# Patient Record
(demographics unavailable — no encounter records)

---

## 2017-01-01 NOTE — PN
Date/Time of Note


Date/Time of Note


DATE: 6/10/17 


TIME: 11:13





Neonatology History


Date/Time


Admit Date/Time


May 25, 2017 at 20:50





Day of Life


Day of Life


17





History of Present Illness


HPI


 31-5/7 week first of twins 1469 g infant of gestational diabetic mother 

was  labor from 24-6/7 week, hypermagnesemia.  Presently 34-0/7     

week.  Baby required positive pressure ventilation and was subsequently placed 

on bubble CPAP transfer to the NICU.  Taken off CPAP on  AM.  Started on 

caffeine on admission, caffeine dc'd 


Rupture of membranes at  section, no fever, started on antibiotics 

empirically.


Feeding started tolerated with some residuals, on peripheral TPN, dc'd , 

Feeding per gavage 24 alf


Hyperbilirubinemia started on phototherapy on -, Head US  normal


At risk for problems related to prematurity such as apnea hyperbilirubinemia 

feeding intolerance and necrotizing enterocolitis intracranial hemorrhage 

retinopathy of prematurity and long-term neurodevelopmental problems.





Physical Exam


Vital Signs


Vitals





 Vital Signs








  Date Time  Temp Pulse Resp B/P Pulse Ox O2 Delivery O2 Flow Rate FiO2


 


6/10/17 11:00 98.8 150 53  100   


 


6/10/17 08:00 98.6 156 54 70/43 100   


 


6/10/17 07:35  174 56  99   21


 


6/10/17 05:00 98.1 139 58  100   





NPASS Score-Pain: 0





I&O/Weight


I&O


Daily Weight: 1600 grams, Daily Weight change from yesterday: 0 grams, Percent 

change from birth: 8.917, Weight based intake: 160.0000 mL/kg/day, urine output 

9, BM 2.








I & O   


 


 6/10/17 6/10/17 6/10/17





 01:00 09:00 17:00


 


Intake Total 64.0 ml 81.0 ml 32.0 ml


 


Output Total 0 ml 0 ml 


 


Balance 64.0 ml 81.0 ml 32.0 ml


 


 Intake Detail   


 


Bottle 12 ml 17 ml 


 


Tube Feeding 52.0 ml 64.0 ml 32.0 ml


 


 Output Detail   


 


Tube Feeding Residual Discard 0 ml 0 ml 


 


# Urine Diapers 2 3 1


 


# Bowel Movements 1  


 


Daily Weight Change 0 gms  


 


Percent Weight Change from Birth 8.917 %  


 


Tube Feeding Gavage Duration 30 minutes 30 minutes 30 minutes





 30 minutes 30 minutes 





  30 minutes 











Physical Exam


Infant in Isolette, responsive, pink, comfortable in room air


HEENT: Anterior fontanelle soft and flat, eyes no congestion or discharge, ENT 

within normal limits with NG tube in place


Cardiovascular: Rate and rhythm regular, no murmurs, peripheral perfusion is 

adequate


Pulmonary: Equal breath sounds, good air exchange, clear with no retractions 

and normal work of breathing


Abdomen: Soft, nondistended, round, normal bowel sounds, no masses palpable, 

nontender


Genitalia: Normal  male


Neurology: Normal tone and activity for gestational age


Extremities: Adequate range of motion with good perfusion


Skin: Mild perianal erythema and no rashes


Head Circumference:  29.5





Medications





 Current Medications


Multivitamins/ Vitamin C (Poly-Vi-Sol (Nicu)) 0.5 ml BID PO  Last administered 

on 6/10/17at 08:11; Admin Dose 0.5 ML;  Start 6/3/17 at 09:45


Ferrous Sulfate (Juan-In-Sol 5 Mg/ 0.33 ml (Nicu)) 1.5 mg Q12 PO  Last 

administered on 6/10/17at 08:11; Admin Dose 1.5 MG;  Start 17 at 21:00





Medical Decision Making


Assessment


1.  Fluids and nutrition: Weight today is 1600 g unchanged from yesterday.  

Infant is on full feedings with Similac special care 24 Alf and is receiving 32 

mL every 3 hours over 30 minutes.  Infant nippled 4 times during the last 24 

hours and was able to nipple ranging from 5-32 mL.  Infant required mostly NG 

feedings and is tolerating well with no significant residuals.  Total fluid 

intake 1 60 mL/kg per day, urine output 9, BM 2.  There are no clinical signs 

of gastroesophageal reflux or NEC.


2.  Respiratory:  History of retained lung fluid and bubble CPAP, to room air 

on .  Never had apnea, started on caffeine which was stopped on .  No 

apnea.


3.  Metabolic.  Initial magnesium 5.4, and low calcium asymptomatic improved.  

Stable Accu-Cheks.


4.  Heme:  Hematocrit was 51 platelets 247 on .


5.  Infection:  Congenital sepsis ruled out, antibiotics stopped after 2 days.


6.  GI/bili:  History of phototherapy, maximum bilirubin initial 9.4, rebound 

tenderness 9.8.  Last bilirubin down to 6.4 on 6/3 and jaundice is clinically 

resolved.  Blood type O+ Dena negative.


7.  CNS.  Normal tone and activity, stable temperature in incubator, normal 

neuro exam.  Head ultrasound on  normal.


8.  Cardiovascular: Hemodynamically stable


9.  Social.  Parents visiting and aware of the infant's clinical condition as 

well as the treatment plans





Today's Plan


Plan


Frequent monitoring of vital signs as well as pulse ox saturations and maintain 

greater than 90% and maintain neutral thermal environment.


Continue to monitor for desaturations as well as apnea prematurity.


Continue the present feedings and p.o. as tolerated and NG as needed monitoring 

for gastroesophageal reflux.


Monitor weight gain.


Monitor hematocrit once in 2 weeks during hospitalization and monitor for 

anemia.


Repeat head ultrasound at 36 weeks of gestation to rule out PVL.


ROP examination at 4-6 weeks of age.


Ongoing parental support and teaching.











PEYTON STEVEN MD Humza 10, 2017 11:21

## 2017-01-01 NOTE — PN
[facility]


 


 


 


 


 Progress Note 


 


Patient Name: Jhonathan Loza Unit Number: [id number]


YOB: 2017 Patient Status: Admitted Inpatient


Attending Doctor: Bee Bhardwaj MD Account Number: [id number]


 


Edit: BEE BHARDWAJ MD on 17 @ 22:36





I have seen and examined this infant with RAI TAYLOR.  Concur with physical 

examination and assessment. HEENT normal, chest clear good breath sounds, heart 

regular rhythm no murmurs, abdomen soft good bowel sounds no organomegaly, 

genitalia normal, extremities full range of motion good perfusion, CNS tone 

appropriate, skin pink no rashes.  Concur with plan to work on nutritive support

, monitor for respiratory distress or apnea prematurity, follow hematocrit 

weekly, complete discharge training and teaching.


________________________________________________________________________________

_____


  


Date/Time of Note


Date/Time of Note


DATE: 17 


TIME: 09:15





Neonatology History


Date/Time


Admit Date/Time


May 25, 2017 at 20:50





Day of Life


Day of Life


15





History of Present Illness


HPI


 31-5/7 week first of twins 1469 g infant of gestational diabetic mother 

was  labor from 24-6/7 week, hypermagnesemia.  Presently 33-5/7     

week.  Baby required positive pressure ventilation and was subsequently placed 

on bubble CPAP transfer to the NICU.  Taken off CPAP on  AM.  Started on 

caffeine on admission.caffeine dc'd 


Rupture of membranes at  section, no fever, started on antibiotics 

empirically.


Feeding started tolerated with some residuals, on peripheral TPN, dc'd , 

Feeding per gavage 24 alf


Hyperbilirubinemia started on phototherapy on -, Head US  normal


At risk for problems related to prematurity such as apnea hyperbilirubinemia 

feeding intolerance and necrotizing enterocolitis intracranial hemorrhage 

retinopathy of prematurity and long-term neurodevelopmental problems.





Physical Exam


Vital Signs


Vitals





 Vital Signs








  Date Time  Temp Pulse Resp B/P Pulse Ox O2 Delivery O2 Flow Rate FiO2


 


17 08:00 98.6 172 32 68/48 100   


 


17 07:20  148 42  99   21


 


17 05:00 98.6 155 35  99   


 


17 03:08  158 35  100   21


 


17 02:00 98.6 165 57  100   





NPASS Score-Pain: 0





I&O/Weight


I&O


Daily Weight: 1560 grams, Daily Weight change from yesterday: 55.0 grams, 

Percent change from birth: 6.194, Weight based intake: 153.8461 mL/kg/day, 

Weight based output: 3.543 mL/kg/hr











I & O   


 


 17





 00:59 08:59 16:59


 


Intake Total 90.0 ml 91.0 ml 


 


Output Total 0 ml 0 ml 


 


Balance 90.0 ml 91.0 ml 


 


 Intake Detail   


 


Bottle  8 ml 


 


Tube Feeding 90.0 ml 83.0 ml 


 


 Output Detail   


 


Tube Feeding Residual Discard 0 ml 0 ml 


 


# Urine Diapers 3 3 


 


# Bowel Movements 1 0 


 


Daily Weight Change 55.0!^di  


 


Percent Weight Change from Birth 6.194 %  


 


Tube Feeding Gavage Duration 45 minutes 30 minutes 





 30 minutes 30 minutes 





 30 minutes 30 minutes 











Physical Exam


Active and alert.  In giraffe Isolette


HEENT: [place] soft and flat. Eyes clear without drainage. Ears nose and 

throat without abnormality.


Pulmonary: Respirations are comfortable, breath sounds are bilaterally clear 

and equal.


Cardiovascular: Heart rate and rhythm are normal, no murmur is auscultated. 

Perfusion is good with  quick capillary refill.


Abdomen: Soft without distention. No masses palpated.


: Normal male genitalia.


Neuro: Tone and behavior appropriate for gestational age.


Dermatology: Skin clear and free of rashes.


Extremities: Full range of motion, tone and behavior appropriate for 

gestational age.


Head Circumference:  29.5





Medications





 Current Medications


Multivitamins/ Vitamin C (Poly-Vi-Sol (Nicu)) 0.5 ml BID PO  Last administered 

on t 08:16; Admin Dose 0.5 ML;  Start 6/3/17 at 09:45


Ferrous Sulfate (Juan-In-Sol 5 Mg/ 0.33 ml (Nicu)) 1.5 mg Q12 PO  Last 

administered on t 08:16; Admin Dose 1.5 MG;  Start 17 at 21:00





Medical Decision Making


Assessment


1.  Fluids and nutrition.  The weight is 1560 up 55 g.  Intake 153 mL/kg urine 

4.3 mL/kg/h stool 4.  Tolerating feeding breast milk 24 alf or special care 24 

at 31 mL every 3 hours by gavage,nippled once taking 8 mls.  IV was 

discontinued on 


2.  Respiratory.  Retained lung fluid, bubble CPAP treatment, went to room air 

on .  Started on caffeine, never had apnea.caffeine dc'd 


3.  Metabolic.  Initial magnesium 5.4, low calcium asymptomatic, improved.  

Stable Accu-Cheks.


4.  Heme.  Hematocrit 51 platelets 247 on 


5.  Infection.  Congenital sepsis ruled out, antibiotics stopped after 2 days.


6.  GI/bili.  History of phototherapy, maximum bilirubin initially 9.4 and up 

to 9.8 and a rebound after phototherapy.  Bilirubin is down to 6.4 on 6/3, and 

baby is not jaundiced.  Blood type O+ Dena negative.


7.  CNS.  Normal neuro exam, normal tone and activity, temperature stable in 

incubator.  Head ultrasound on  normal.


8.  Cardiovascular.  Hemodynamically stable.


9.  Social.  Parents visiting, and updated.





Today's Plan


Plan


Continue neutral thermal environment, monitor feeding tolerance and weight gain.


monitor for apnea off caffeine


continue Juan-In-Sol, monitor hemogram


Monitor for problems related to prematurity


Support parents with information and teaching..


CUS at 36 wks and ROP exam at 4 to 6 weeks











HERBERT NELSON NP 2017 09:17

## 2017-01-01 NOTE — DS
Date/Time of Note


Date/Time of Note


DATE: 17 


TIME: 09:19





Discharge Summary


Admission/Discharge Info


Admit Date/Time


May 25, 2017 at 20:50


Discharge Date/Time


2017


Final Diagnosis


31 5/7 wk premature twin now 35 wks corrected gestational age.  Status post 

respiratory distress secondary to retained lung fluid requiring bubble CPAP 

support for 24 hours, s/p apnea of prematurity, status post hyperbilirubinemia, 

at risk for ROP secondary to low birthweight and prematurity.


Patient Condition:  Stable


Procedures


IV fluid administration, CPAP support, phototherapy, cranial ultrasound, 

hearing screen, car seat challenge,CCHD screen.


Hx of Present Illness


 31-5/7 week first of twins 1469 g infant of gestational diabetic mother 

with  labor from 24-6/7 week, hypermagnesemia. Baby required positive 

pressure ventilation and was subsequently placed on bubble CPAP transfer to the 

NICU.  Taken off CPAP on  AM.  Started on caffeine on admission, caffeine 

dc'd 


Rupture of membranes at  section, no fever, started on antibiotics 

empirically.


Hospital Course


Infant was born by primary  for footling breech twin to mom in  

labor.  Apgars were 5 and 8.  Admitted to NICU due to prematurity and 

respiratory distress


Respiratory: Infant received positive pressure ventilation and CPAP support in 

the delivery room and was supported with bubble CPAP for 24 hours with mild 

FiO2 requirements.  He was started on caffeine prophylactically on admission 

and has had no active events.  Caffeine was discontinued on .





Cardiovascular: Infant has been hemodynamically stable with no murmurs 

auscultated.  Mean blood pressure ranges are in the 40s.  Perfusion is good 

with quick capillary refill and pulses are equal and palpable 4.  See CHD 

screen was performed and passed on Yvette 3.





Infectious disease: Due to history of  labor and initial respiratory 

distress: Infant was placed on ampicillin and gentamicin and gentamicin 

empirically.  Initial CBCs were reassuring and blood cultures negative and 

antibiotics discontinued after 48 hours.  Hepatitis B vaccination was 

administered .





Growth and nutrition: The infant was started on IV fluids on admission with 

total parental nutrition and slow enteral feedings were introduced and 

tolerated.  IV fluids were discontinued on .  Infant has been tolerating 

on nipple feedings the last 2 days prior to discharge taking NeoSure or breast 

milk fortified to 22 alf with NeoSure powder, taking 40-50 mL's with each 

feeding with consistent weight gain.





Hematology: Mom's blood type O+ baby is O+ as well with a negative Dena.  Had 

mild elevation of bilirubin and required phototherapy  through .  

Maximum bilirubin was 9.4.  Last bilirubin was checked on Yvette 3 with a value 

of 6.4.  Last hematocrit was 38 on .  Infant has been on supplemental 

iron p.o.





Neuro: Cranial ultrasound was performed on  which was normal.  Hearing 

screen was performed on  in which he passed in a car seat challenge on 

 in which he passed.  Is recommended that he have an initial ROP exam at 

4-6 weeks of age as an outpatient with   due to low birthweight and 

gestational age.





Social: Parents have been visiting daily and demonstrated ability to care for  

baby


Follow-up Plan


Plan is to discharge home to the care of the family with feedings of breastmilk 

fortified to 22-calorie using NeoSure powder, with ad koko. volumes.  Infant is 

been taking 40-50 mL's with each feeding here.  Administer multivitamins 1 mL 

p.o. daily and iron supplements of 3.6 mg a day.  Follow-up with Dr. whit Iyer on 

Monday, .  Was recommended infant have an ROP exam with Dr. Gann as  

an outpatient  at 4-6 weeks of life which would be in 1-2 weeks.  I have 

requested social service department to schedule the appointment for the family 

and call the family and inform them.  I have also provided the family with Dr. Gann's phone number.


Primary Care Provider


Care Physician No Primary


Time spent on discharge:   > 30 minutes











HERBERT NELSON NP 2017 09:30

## 2017-01-01 NOTE — PN
Date/Time of Note


Date/Time of Note


DATE: 17 


TIME: 11:36





Neonatology History


Date/Time


Admit Date/Time


May 25, 2017 at 20:50





Day of Life


Day of Life


16





History of Present Illness


HPI


 31-5/7 week first of twins 1469 g infant of gestational diabetic mother 

was  labor from 24-6/7 week, hypermagnesemia.  Presently 33-6/7     

week.  Baby required positive pressure ventilation and was subsequently placed 

on bubble CPAP transfer to the NICU.  Taken off CPAP on  AM.  Started on 

caffeine on admission, caffeine dc'd 


Rupture of membranes at  section, no fever, started on antibiotics 

empirically.


Feeding started tolerated with some residuals, on peripheral TPN, dc'd , 

Feeding per gavage 24 alf


Hyperbilirubinemia started on phototherapy on -, Head US  normal


At risk for problems related to prematurity such as apnea hyperbilirubinemia 

feeding intolerance and necrotizing enterocolitis intracranial hemorrhage 

retinopathy of prematurity and long-term neurodevelopmental problems.





Physical Exam


Vital Signs


Vitals





 Vital Signs








  Date Time  Temp Pulse Resp B/P Pulse Ox O2 Delivery O2 Flow Rate FiO2


 


17 11:12  146 66  100   21


 


17 08:00 98.6 176 42 88/53 100   


 


17 07:38  158 51  100   21


 


17 05:00 98.8 156 53 78/41 100   





NPASS Score-Pain: 0





I&O/Weight


I&O


Daily Weight: 1600 grams, Daily Weight change from yesterday: 40.0 grams, 

Percent change from birth: 8.917, Weight based intake: 155.0000 mL/kg/day, 

Weight based output: 3.543 mL/kg/hr











I & O   


 


 17





 01:00 09:00 17:00


 


Intake Total 62.0 ml 94.0 ml 


 


Balance 62.0 ml 94.0 ml 


 


 Intake Detail   


 


Bottle  25 ml 


 


Tube Feeding 62.0 ml 69.0 ml 


 


 Output Detail   


 


# Urine Diapers 2 3 


 


# Bowel Movements  1 


 


Daily Weight Change 40.0!^di  


 


Percent Weight Change from Birth 8.917 %  


 


Tube Feeding Gavage Duration 30 minutes 30 minutes 





 30 minutes 30 minutes 





  5 minutes 











Physical Exam


Pink no distress in room air in incubator NG tube no distress.


Temperature 98.6 heart rate 146 respirations 66 blood pressure 88/53 mean 64


[place] sutures normal eyes ears nose throat without abnormality


Chest no retractions clear breath sounds heart sounds normal no murmur


Abdomen soft no mass organomegaly or hernia no distention


Extremities normal pulses


Genitalia normal  male testes descended


Skin no lesions or rashes no jaundice


CNS normal tone and activity


Head Circumference:  29.5





Medications





 Current Medications


Multivitamins/ Vitamin C (Poly-Vi-Sol (Nicu)) 0.5 ml BID PO  Last administered 

on  08:27; Admin Dose 0.5 ML;  Start 6/3/17 at 09:45


Ferrous Sulfate (Juan-In-Sol 5 Mg/ 0.33 ml (Nicu)) 1.5 mg Q12 PO  Last 

administered on  08:; Admin Dose 1.5 MG;  Start 17 at 21:00





Medical Decision Making


Assessment


Day of life 16.  Postmenstrual rate 33-6/7 week.  Weight is 1600 up 40 g.


Medication Juan-In-Sol Poly-Vi-Sol


1.  Fluids and nutrition.  The weight is 1600 up 40 g.  Intake 155 mL/kg urine 

8 stool 3.  Tolerating feeding rest milk 24 alf or special care 24 at 31 mL 

every 3 hours took a few p.o. partial feeding.


2.  Respiratory.  History of retained lung fluid and bubble CPAP, to room air 

on .  Never had apnea, started on caffeine which was stopped on .  No 

apnea.


3.  Metabolic.  Initial magnesium 5.4, and low calcium asymptomatic improved.  

Stable Accu-Cheks.


4.  Heme.  Hematocrit was 51 platelets 247 on .


5.  Infection.  Congenital sepsis ruled out, antibiotics stopped after 2 days.


6.  GI/bili.  History of phototherapy, maximum bilirubin initial 9.4, rebound 

tenderness 9.8.  Last bilirubin down to 6.4 on 6/3 and jaundice is clinically 

resolved.  Blood type O+ Dena negative.


7.  CNS.  Normal tone and activity, stable temperature in incubator, normal 

neuro exam.  Head ultrasound on  normal.


8.  Cardiovascular: Hemodynamically stable


9.  Social.  Parents visiting and have been updated.





Today's Plan


Plan


Continue neutral thermal environment


Continue nutritive support with 24 alf and gavage


Monitor hemogram


Monitor for apnea of caffeine discontinued on 6/5.


Monitor for problems related to prematurity


Repeat head ultrasound at 36 weeks


Retinopathy of prematurity exam at 4-6 weeks


Support parents with information and teaching











CONNER DIAZ 2017 11:42

## 2017-01-01 NOTE — PN
Date/Time of Note


Date/Time of Note


DATE: 17 


TIME: 11:38





Neonatology History


Date/Time


Admit Date/Time


May 25, 2017 at 20:50





Day of Life


Day of Life


4





History of Present Illness


HPI


 31-5/7 week first of twins 1469 g infant of gestational diabetic mother 

was  labor from 24-6/7 week, hypermagnesemia.  Presently 32-1/7     

week.  Baby required positive pressure ventilation and was subsequently placed 

on bubble CPAP transfer to the NICU.  Takenl off CPAP on  6 AM.  Started on 

caffeine on admission.


Rupture of membranes at  section, no fever, started on antibiotics 

empirically.


Feeding started tolerated with some residuals, on peripheral TPN.


Hyperbilirubinemia started on phototherapy on .


At risk for problems related to prematurity such as apnea hyperbilirubinemia 

feeding intolerance and necrotizing enterocolitis intracranial hemorrhage 

retinopathy of prematurity and long-term neurodevelopmental problems.





Physical Exam


Vital Signs


Vitals





 Vital Signs








  Date Time  Temp Pulse Resp B/P Pulse Ox O2 Delivery O2 Flow Rate FiO2


 


17 08:00 98.6 130 53 63/31 98   


 


17 07:35  162 60  99   21


 


17 05:00 98.2 140 50 77/40 100   





NPASS Score-Pain: 0





I&O/Weight


I&O


Daily Weight: 1290 grams, Daily Weight change from yesterday: -85.0 grams, 

Percent change from birth: -12.185, Weight based intake: 132.6530 mL/kg/day, 

Weight based output: 4.623 mL/kg/hr











I & O   


 


 17





 01:00 09:00 17:00


 


Intake Total 63.100 ml 62.075 ml 


 


Output Total 53.00 ml 74.00 ml 


 


Balance 10.100 ml -11.925 ml 


 


 Intake Detail   


 


IV Total 50.100 ml 40.075 ml 


 


Tube Feeding 13.0 ml 22.0 ml 


 


 Output Detail   


 


Urine Total 53.00 ml 74.00 ml 


 


Tube Feeding Residual Discard 0 ml 0 ml 


 


# Bowel Movements 1  


 


Daily Weight Change -85.0!^di  


 


Percent Weight Change from Birth -12.185 %  


 


Tube Feeding Gavage Duration 10 minutes 10 minutes 





 10 minutes 10 minutes 





  30 minutes 











Physical Exam


Pink no distress in incubator, room air, NG tube, peripheral IV, double 

phototherapy


Temperature 98.6 heart rate 130 respiration 53 pressure 63/31 mean 42


Happy sutures normal, EENT normal


Chest no retractions clear breath sounds heart sounds normal no murmur


Abdomen soft no distention cord stump dry no mass organomegaly or hernia


Extremities normal perfusion and pulses


Genitalia normal  male testes descended


Skin no lesions, jaundice not appreciated under phototherapy


Neuro normal exam


Head Circumference:  29.5





Medications





 Current Medications


Ampicillin (Ampicillin Iv Syg ([facility])) 75 mg Q12 IV*  Last administered on  09:42; Admin Dose 75 MG;  Start 17 at 22:00


Gentamicin Sulfate (Gentamicin Iv Syg ([facility])) 6.6 mg Q36H IV*  Last 

administered on  10:39; Admin Dose 6.6 MG;  Start 17 at 23:00


Caffeine Citrated 8.8 mg 8.8 mg Q24H IV  Last administered on  00:33; 

Admin Dose 8.8 MG;  Start 17 at 22:00


Total Parenteral Nutrition 250 ml @  5.7 mls/hr Q24H IV  Last administered on  12:42; Admin Dose 5.7 MLS/HR;  Start 17 at 13:00


Fat Emulsion Intravenous (Liposyn Ii 20% (Nicu)) 15 ml @  0.625 mls/ hr Q24H IV

  Last administered on  12:42; Admin Dose 0.625 MLS/HR;  Start 17 

at 13:00





Laboratory


Results 24 hrs





Laboratory Tests








Test


  17


13:59 17


04:30 17


04:34


 


Bedside Glucose 79    119  


 


Sodium Level  138   


 


Potassium Level  6.1  *H 


 


Chloride Level  114  H 


 


Carbon Dioxide Level  19  L 


 


Anion Gap  11   


 


Calcium Level  9.1   


 


Total Bilirubin  5.0  # 


 


Direct Bilirubin  0.00  L 


 


Indirect Bilirubin  5.0   











Medical Decision Making


Assessment


Day of life 4.  Postmenstrual rate 32-1/7 week.  Weight is 1290 down 85 g.


Medication ampicillin gentamicin caffeine IV.


Laboratory Accu-Chek 119 bilirubin 5.0 sodium 138 potassium 6.1 chloride 114 

CO2 19 calcium 9.1.


1.  Fluids and nutrition.  The weight is 1290 down 85 g.  Intake 132 mL/kg 

urine 4.6 mL/kg/h stool 1.  Tolerating feeding breast milk or special 20 alf 

at up to 8 mL every 3 hours, is on TPN dextrose 11% amino acid 4 lipids 2 g/kg 

at 130 mL/kg per day


2.  Respiratory.  History of transient support his bubble CPAP probably 

retained lung fluids, discontinue on .  Baby was started initially on 

caffeine.  No apnea.


3.  Metabolic.  Initial magnesium 5.4.  Infant of gestational diabetic mom, 

blood sugar stable last Accu-Chek 119.  Calcium slightly low and has improved.


4.  Heme.  Hematocrit 51 platelets 247 on .


5.  Infection.  Started on ampicillin gentamicin, CBC was normal, blood culture 

negative more than 48 hours.


6.  GI/bili.  On phototherapy was a maximum bilirubin of 9.4 down to 5.0.  

Blood type is O+ Dena negative No bruising or cephalic hematoma.


7.  CNS.  Normal tone and activity normal exam and maintaining temperature in 

incubator


8.  Cardiac.  Hemodynamically stable.  No murmur normal perfusion and pulses


9.  Social.  Parents visiting and updated..





Today's Plan


Plan


Stop antibiotics


Change to single phototherapy, monitor bilirubin


Advance feeding and continue TPN support, increase total fluid goal to 150 mL/kg

, dextrose 12% lipids 3 per acute


Continue caffeine, monitor for apnea


Monitor for problems related to prematurity


Support parents with information and teaching


Head ultrasound 7 days of age.











CONNER DIAZ May 28, 2017 11:44

## 2017-01-01 NOTE — PN
[facility]


 


 


 


 


 Progress Note 


 


Patient Name: Jhonathan Loza Unit Number: [id number]


YOB: 2017 Patient Status: Admitted Inpatient


Attending Doctor: Bee Bhardwaj MD Account Number: [id number]


 


Edit: BEE BHARDWAJ MD on 6/15/17 @ 15:08





I have seen and examined this infant with RAI TAYLOR.  Concur with physical 

examination and assessment. HEENT normal, chest clear good breath sounds, heart 

regular rhythm no murmurs, abdomen soft good bowel sounds no organomegaly, 

genitalia normal, extremities full range of motion good perfusion, CNS tone 

appropriate, skin pink no rashes.  Concur with plan to work on nutritive support

, monitor for respiratory distress or apnea prematurity, follow hematocrit 

weekly, follow-up head ultrasound prior to discharge, complete discharge 

training and teaching.


________________________________________________________________________________

_____


  


Date/Time of Note


Date/Time of Note


DATE: 6/15/17 


TIME: 09:35





Neonatology History


Date/Time


Admit Date/Time


May 25, 2017 at 20:50





Day of Life


Day of Life


22





History of Present Illness


HPI


 31-5/7 week first of twins 1469 g infant of gestational diabetic mother 

was  labor from 24-6/7 week, hypermagnesemia.  Presently 34-5/7     

week.  Baby required positive pressure ventilation and was subsequently placed 

on bubble CPAP transfer to the NICU.  Taken off CPAP on  AM.  Started on 

caffeine on admission, caffeine dc'd 


Rupture of membranes at  section, no fever, started on antibiotics 

empirically.


Feeding started tolerated with some residuals, on peripheral TPN, dc'd /, 

Feeding  24 alf,nippling improving


Hyperbilirubinemia started on phototherapy on -, Head US  normal


At risk for problems related to prematurity such as apnea hyperbilirubinemia 

feeding intolerance and necrotizing enterocolitis intracranial hemorrhage 

retinopathy of prematurity and long-term neurodevelopmental problems.





Physical Exam


Vital Signs


Vitals





 Vital Signs








  Date Time  Temp Pulse Resp B/P Pulse Ox O2 Delivery O2 Flow Rate FiO2


 


6/15/17 08:30 98.8 152 57 83/46 100   


 


6/15/17 07:28  140 56  99   21


 


6/15/17 05:30 97.9 158 53  100   


 


6/15/17 03:02  154 91  100   21


 


6/15/17 02:30 97.7 143 35  100   





NPASS Score-Pain: 0





I&O/Weight


I&O


Daily Weight: 1800 grams, Daily Weight change from yesterday: 40.0 grams, 

Percent change from birth: 22.532, Weight based intake: 154.4444 mL/kg/day, 

Weight based output: 0 mL/kg/hr











I & O   


 


 6/15/17 6/15/17 6/15/17





 00:59 08:59 16:59


 


Intake Total 105.0 ml 107.0 ml 


 


Output Total 0 ml 0 ml 


 


Balance 105.0 ml 107.0 ml 


 


 Intake Detail   


 


Bottle 39 ml 84 ml 


 


Tube Feeding 66.0 ml 23.0 ml 


 


 Output Detail   


 


Tube Feeding Residual Discard 0 ml 0 ml 


 


# Urine Diapers 3 3 


 


# Bowel Movements 1  


 


Daily Weight Change 40.0!^di  


 


Percent Weight Change from Birth 22.532 %  


 


Tube Feeding Gavage Duration 20 minutes 20 minutes 





 20 minutes 5 minutes 





 30 minutes  











Physical Exam


Active and alert in open bassinet.


HEENT: [place] soft and flat. Eyes clear without drainage. Ears nose and 

throat without abnormality.


Pulmonary: Respirations are comfortable, breath sounds are bilaterally clear 

and equal.


Cardiovascular: Heart rate and rhythm are normal, no murmur is auscultated. 

Perfusion is good with  quick capillary refill.


Abdomen: Soft without distention. No masses palpated.


: Normal male genitalia.


Neuro: Tone and behavior appropriate for gestational age.


Dermatology: Skin clear and free of rashes.


Extremities: Full range of motion, tone and behavior appropriate for 

gestational age.


Head Circumference:  31.0





Medications





 Current Medications


Multivitamins/ Vitamin C (Poly-Vi-Sol (Nicu)) 0.5 ml BID PO  Last administered 

on 6/15/17at 08:21; Admin Dose 0.5 ML;  Start 6/3/17 at 09:45


Ferrous Sulfate (Juan-In-Sol 5 Mg/ 0.33 ml (Nicu)) 3.6 mg DAILY PO  Last 

administered on 6/15/17at 08:22; Admin Dose 3.6 MG;  Start 6/15/17 at 09:00





Medical Decision Making


Assessment


1.  Fluids and nutrition: Weight today is 1800 g, increased by 40 g from 

yesterday.  Infant is on full feedings with fortified breastmilk 24 Alf at 36 

mL every 3 hours over 30 minutes.  Infant nippled 6 feedings ranging from 15-32 

mL.  Received 2 complete NG feedings and 6 partial NG feedings, completing 48% 

by bottle. and is tolerating well with intermittent residuals ranging from 0.2 

mL to 3 mL.  Total fluid intake 154 mL/kg per day, urine output 8, BM 1.  

There are no clinical signs of gastroesophageal reflux or NEC.  Abdominal 

examination is benign and infant is gaining weight.


2.  Respiratory:  History of retained lung fluid and bubble CPAP, to room air 

on .  Never had apnea, started on caffeine which was stopped on .  No 

apnea.


3.  Metabolic.  Initial magnesium 5.4, and low calcium asymptomatic improved.  

Stable Accu-Cheks.


4.  Heme:  Hematocrit was 38 platelets 510 on 


5.  Infection:  Congenital sepsis ruled out, antibiotics stopped after 2 days.


6.  GI/bili:  History of phototherapy, maximum bilirubin initial 9.4, rebound 

9.8.  Last bilirubin down to 6.4 on 6/3 and jaundice is clinically resolved.  

Blood type O+ Dena negative.


7.  CNS.  Normal tone and activity, stable temperature in bassinete, normal 

neuro exam.  Head ultrasound on  normal.


8.  Cardiovascular: Hemodynamically stable


9.  Social.  Parents visiting and aware of the infant's clinical condition as 

well as the treatment plans





Today's Plan


Plan


Frequent monitoring of vital signs as well as pulse ox saturations and maintain 

greater than 90% and maintain neutral thermal environment.


Continue to monitor for desaturations as well as apnea prematurity.


Continue the present feedings and p.o. as tolerated and NG as needed, 

monitoring for gastroesophageal reflux.


Monitor weight gain.


Monitor hematocrit once in 2 weeks during hospitalization and monitor for 

anemia.


Repeat head ultrasound at 36 weeks of gestation to rule out PVL.


ROP examination at 4-6 weeks of age.


Ongoing parental support and teaching.











HERBERT NELSON NP Humza 15, 2017 09:37

## 2017-01-01 NOTE — PN
Date/Time of Note


Date/Time of Note


DATE: 17 


TIME: 12:45





Neonatology History


Date/Time


Admit Date/Time


May 25, 2017 at 20:50





Day of Life


Day of Life


12





History of Present Illness


HPI


 31-5/7 week first of twins 1469 g infant of gestational diabetic mother 

was  labor from 24-6/7 week, hypermagnesemia.  Presently 33-2/7     

week.  Baby required positive pressure ventilation and was subsequently placed 

on bubble CPAP transfer to the NICU.  Taken off CPAP on  AM.  Started on 

caffeine on admission.


Rupture of membranes at  section, no fever, started on antibiotics 

empirically.


Feeding started tolerated with some residuals, on peripheral TPN, dc'd , 

Feeding per gavage 24 alf


Hyperbilirubinemia started on phototherapy on -, Head US  normal


At risk for problems related to prematurity such as apnea hyperbilirubinemia 

feeding intolerance and necrotizing enterocolitis intracranial hemorrhage 

retinopathy of prematurity and long-term neurodevelopmental problems.





Physical Exam


Vital Signs


Vitals





 Vital Signs








  Date Time  Temp Pulse Resp B/P Pulse Ox O2 Delivery O2 Flow Rate FiO2


 


17 11:09  168 62  100   21


 


17 11:00 98.2 148 50  100   


 


17 08:00 98.2 153 35  100   


 


17 07:19  152 49  100   21


 


17 05:00 99.0 157 50 63/40 100   





NPASS Score-Pain: 0





I&O/Weight


I&O


Daily Weight: 1450 grams, Daily Weight change from yesterday: 45.0 grams, 

Percent change from birth: -1.293, Weight based intake: 156.4625 mL/kg/day, 

Weight based output: 4.339 mL/kg/hr











I & O   


 


 17





 01:00 09:00 17:00


 


Intake Total 58.0 ml 87.0 ml 29.0 ml


 


Output Total 29.00 ml 54.00 ml 0 ml


 


Balance 29.00 ml 33.00 ml 29.0 ml


 


 Intake Detail   


 


Bottle   2 ml


 


Tube Feeding 58.0 ml 87.0 ml 27.0 ml


 


 Output Detail   


 


Urine Total 29.00 ml 54.00 ml 0 ml


 


Tube Feeding Residual Discard 0 ml 0 ml 0 ml


 


# Bowel Movements 1 1 0


 


Daily Weight Change 45.0!^di  


 


Percent Weight Change from Birth -1.293 %  


 


Tube Feeding Gavage Duration 60 minutes 60 minutes 60 minutes





 60 minutes 60 minutes 





  60 minutes 











Physical Exam


Pink no distress in incubator, room air, NG tube


Temperature 98.2 heart rate 168 respirations 62 blood pressure 63/40 mean 47.


[place] sutures normal EENT normal


Chest no retractions, clear breath sounds, heart sounds normal, no murmur.


Abdomen soft and nondistended no mass organomegaly or hernia


Genitalia normal  male testes descended


Extremities normal perfusion and pulses no edema


Skin no lesions or rashes


CNS normal tone and activity normal response to stimulation.


Head Circumference:  29.5





Medications





 Current Medications


Caffeine Citrated (Cafcit Liquid ([facility])) 8.8 mg Q24H PO  Last administered on  22:20; Admin Dose 8.8 MG;  Start 17 at 22:00


Multivitamins/ Vitamin C (Poly-Vi-Sol ([facility])) 0.5 ml BID PO  Last administered 

on  08:53; Admin Dose 0.5 ML;  Start 6/3/17 at 09:45





Medical Decision Making


Assessment


Day of life 12.  Postmenstrual rate 33-2/7 week.  Weight is 1450 up 45 g.


Medication caffeine citrate, Poly-Vi-Sol.


1.  Fluids and nutrition.  The weight is 1450 up 45 g.  Intake 156 mL/kg urine 

4.3 mL/kg/h stool 4.  Tolerating feeding breast milk 24 alf or special care 24 

at 29 mL every 3 hours all by gavage.  IV was discontinued on , has started 

to gain weight


2.  Respiratory.  Retained lung fluid, bubble CPAP treatment, went to room air 

on .  Started on caffeine, change to p.o. on , never had apnea.


3.  Metabolic.  Initial magnesium 5.4, low calcium asymptomatic, improved.  

Stable Accu-Cheks.


4.  Heme.  Hematocrit 51 platelets 247 on 


5.  Infection.  Congenital sepsis ruled out, antibiotics stopped after 2 days.


6.  GI/bili.  History of phototherapy, maximum bilirubin initially 9.4 and up 

to 9.8 and a rebound after phototherapy.  Bilirubin is down to 6.4 on 6/3, and 

baby is not jaundiced.  Blood type O+ Dena negative.


7.  CNS.  Normal neuro exam, normal tone and activity, temperature stable in 

incubator.  Head ultrasound on  normal.


8.  Cardiovascular.  Hemodynamically stable.


9.  Social.  Parents visiting, and updated.





Today's Plan


Plan


Continue neutral thermal environment, monitor feeding tolerance and weight gain.


Stop caffeine, monitor for apnea


Start Juan-In-Sol, monitor hemogram


Monitor for problems related to prematurity


Support parents with information and teaching..











CONNER DIAZ 2017 12:50

## 2017-01-01 NOTE — PN
[facility]


 


 


 


 


 Progress Note 


 


Patient Name: Jhonathan Loza Unit Number: [id number]


YOB: 2017 Patient Status: Admitted Inpatient


Attending Doctor: Bee Bhardwaj MD Account Number: [id number]


 


Edit: BEE BHARDWAJ MD on 17 @ 12:36





I have seen and examined this infant with RAI TAYLOR.  Concur with physical 

examination and assessment. HEENT normal, chest clear good breath sounds, heart 

regular rhythm no murmurs, abdomen soft good bowel sounds no organomegaly, 

genitalia normal, extremities full range of motion good perfusion, CNS tone 

appropriate, skin pink no rashes.  Concur with plan to work on nutritive 

support and discontinue to parenteral nutrition, monitor for respiratory 

distress or apnea prematurity continue caffeine, follow hematocrit weekly, 

complete discharge training and teaching.


________________________________________________________________________________

_____


  


Date/Time of Note


Date/Time of Note


DATE: 17 


TIME: 09:26





Neonatology History


Date/Time


Admit Date/Time


May 25, 2017 at 20:50





Day of Life


Day of Life


9





History of Present Illness


HPI


 31-5/7 week first of twins 1469 g infant of gestational diabetic mother 

was  labor from 24-6/7 week, hypermagnesemia.  Presently 32-6/7     

week.  Baby required positive pressure ventilation and was subsequently placed 

on bubble CPAP transfer to the NICU.  Taken off CPAP on  6 AM.  Started on 

caffeine on admission.


Rupture of membranes at  section, no fever, started on antibiotics 

empirically.


Feeding started tolerated with some residuals, on peripheral TPN,dc'd 


Hyperbilirubinemia started on phototherapy on -


At risk for problems related to prematurity such as apnea hyperbilirubinemia 

feeding intolerance and necrotizing enterocolitis intracranial hemorrhage 

retinopathy of prematurity and long-term neurodevelopmental problems.





Physical Exam


Vital Signs


Vitals





 Vital Signs








  Date Time  Temp Pulse Resp B/P Pulse Ox O2 Delivery O2 Flow Rate FiO2


 


17 08:00 98.6 153 48 66/48 99   


 


17 07:28  182 50  100   21


 


17 05:00 98.2 164 56  100   


 


17 03:01  167 27  100   21


 


17 02:00 98.1 152 42 68/35 100   





NPASS Score-Pain: 0





I&O/Weight


I&O


Daily Weight: 1370 grams, Daily Weight change from yesterday: 40.0 grams, 

Percent change from birth: -6.739, Weight based intake: 167.6642 mL/kg/day, 

Weight based output: 3.710 mL/kg/hr











I & O   


 


 17





 00:59 08:59 16:59


 


Intake Total 84.4 ml 85.5 ml 


 


Output Total 63.00 ml 55.00 ml 


 


Balance 21.40 ml 30.50 ml 


 


 Intake Detail   


 


IV Total 11.4 ml 8.5 ml 


 


Tube Feeding 73.0 ml 77.0 ml 


 


 Output Detail   


 


Urine Total 63.00 ml 55.00 ml 


 


# Bowel Movements 2 1 


 


Daily Weight Change 40.0!^di  


 


Percent Weight Change from Birth -6.739 %  


 


Tube Feeding Gavage Duration 60 minutes 60 minutes 





 60 minutes 60 minutes 





 60 minutes 60 minutes 











Physical Exam


Active and alert in [facility] under phototherapy.


HEENT: [place] soft and flat. Eyes clear without drainage. Ears nose and 

throat without abnormality.


Pulmonary: Respirations are comfortable, breath sounds are bilaterally clear 

and equal.


Cardiovascular: Heart rate and rhythm are normal, no murmur is auscultated. 

Perfusion is good with  quick capillary refill.


Abdomen: Soft without distention. No masses palpated.


: Normal male genitalia.


Neuro: Tone and behavior appropriate for gestational age.


Dermatology: Skin clear and free of rashes.  Mild jaundice


Extremities: Full range of motion, tone and behavior appropriate for 

gestational age.


Head Circumference:  29.5





Medications





 Current Medications


Caffeine Citrated 8.8 mg 8.8 mg Q24H PO  Last administered on  21:56; 

Admin Dose 8.8 MG;  Start 17 at 22:00


Total Parenteral Nutrition (Tpn (Nicu)) 250 ml @  1.5 mls/hr Q24H IV  Last 

administered on 6/1/17at 15:50; Admin Dose 1.5 MLS/HR;  Start 17 at 16:00





Laboratory


Results 24 hrs





Laboratory Tests








Test


  17


17:11 17


04:58 17


05:00


 


Bedside Glucose 101   86   


 


Total Bilirubin   6.0  #











Medical Decision Making


Assessment


1.  Growth and nutrition: Weight today is 1370 g,  up 40 g, 7% from 

birthweight.  Infant is on increasing feedings with the fortified breastmilk 24 

[place] or Similac special care 24 [place] at 26 mL over 30 minutes NG.  Feedings are 

being increased by 1 mL q. other feed.  Tolerating well with no significant 

residuals.  Abdominal examination is benign and there are no clinical signs of 

gastroesophageal reflux or NEC.  Infant is also being supplemented with TPN at 

1  mL/h with stable Chemstrips of 86.  Intake and output is adequate.  

Intralipids were discontinued on .


2.  Respiratory.  Initial bubble CPAP for transient support for retained lung 

fluid, was started on caffeine, changed to p.o. on .  No apnea.


3.  Metabolic.  Initial magnesium 5.4.  Accu-Cheks stable between (infant of 

diabetic mom).  Initial slightly low asymptomatic calcium, improved,, now 10.1 

on 


4.  Heme.  Hematocrit 51 platelets 247 .


5.  Infection.  Congenital sepsis ruled out, antibiotics stopped after 2 days 

on .


6.  GI/bili.  History of phototherapy maximum bilirubin was 9.4.  Blood type O+ 

Dena negative.  Bilirubin level on  is 9.4 and increased from 6.7 on .bili   is 9.8,today bili is 6.


7.  CNS.  Normal tone and activity.  Temperature stable in incubator.CUS  

normal


8.  Cardiac.  Hemodynamic stable.


9.  Social.  Parents are visiting regularly and are aware of the infant's 

clinical condition as well as the treatment plans.





Today's Plan


Plan


Frequent monitoring of vital signs as well as pulse ox saturations and maintain 

greater than 90%.  Maintain neutral thermal environment.


Discontinue TPN and continue to increase feedings 


Monitor for clinical signs of gastroesophageal reflux and NEC.


Continue caffeine and monitor for apnea of prematurity.


Recheck bilirubin level in a.m.


Monitor for clinical signs of PDA.


Monitor for clinical signs of sepsis.


Ongoing parental support and teaching.











HERBERT NELSON NP 2017 09:30

## 2017-01-01 NOTE — PN
[facility] LIVE HCIS


 


 


 


 


 Progress Note 


 


Patient Name: Jhonathan Loza Unit Number: [id number]


YOB: 2017 Patient Status: Admitted Inpatient


Attending Doctor: Mya Markham MD Account Number: [id number]


 


Edit: NAYANA HUTCHISON MD on 17 @ 11:18





I have seen and examined the baby and reviewed the care plan with the nurse 

practitioner.  Agree with exam, evaluation, and treatment 


plan to continue same feeds, encourage nippling and advance as tolerated, 

monitor input, output and weight closely, watch for clinical


Apnea and bradycardia, monitor hematocrit during the hospital course and 

continued hospital observation for stabilization of the nutritional status


And problems related to prematurity.





________________________________________________________________________________

_____


  


Date/Time of Note


Date/Time of Note


DATE: 17 


TIME: 10:27





Neonatology History


Date/Time


Admit Date/Time


May 25, 2017 at 20:50





Day of Life


Day of Life


20





History of Present Illness


HPI


 31-5/7 week first of twins 1469 g infant of gestational diabetic mother 

was  labor from 24-6/7 week, hypermagnesemia.  Presently 34-3/7     

week.  Baby required positive pressure ventilation and was subsequently placed 

on bubble CPAP transfer to the NICU.  Taken off CPAP on  AM.  Started on 

caffeine on admission, caffeine dc'd 


Rupture of membranes at  section, no fever, started on antibiotics 

empirically.


Feeding started tolerated with some residuals, on peripheral TPN, dc'd , 

Feeding per gavage 24 alf


Hyperbilirubinemia started on phototherapy on -, Head US  normal


At risk for problems related to prematurity such as apnea hyperbilirubinemia 

feeding intolerance and necrotizing enterocolitis intracranial hemorrhage 

retinopathy of prematurity and long-term neurodevelopmental problems.





Physical Exam


Vital Signs


Vitals





 Vital Signs








  Date Time  Temp Pulse Resp B/P Pulse Ox O2 Delivery O2 Flow Rate FiO2


 


17 07:14  155 36  100   21


 


17 05:30 99.0 149 48  100   


 


17 03:06  151 41  100   21


 


17 02:30 98.8 146 48  100   





NPASS Score-Pain: 0





I&O/Weight


I&O


Daily Weight: 1740 grams, Daily Weight change from yesterday: 50.0 grams, 

Percent change from birth: 18.447, Weight based intake: 160.9467 mL/kg/day, 

Weight based output: 0 mL/kg/hr











I & O   


 


 17





 01:00 09:00 17:00


 


Intake Total 68.0 ml 68.0 ml 


 


Balance 68.0 ml 68.0 ml 


 


 Intake Detail   


 


Bottle 54 ml 48 ml 


 


Tube Feeding 14.0 ml 20.0 ml 


 


 Output Detail   


 


# Urine Diapers 2 2 


 


# Bowel Movements  1 


 


Daily Weight Change 50.0!^di  


 


Percent Weight Change from Birth 18.447 %  


 


Tube Feeding Gavage Duration 15 minutes 20 minutes 











Physical Exam


Active and alert and [place]


HEENT: [place] soft and flat. Eyes clear without drainage. Ears nose and 

throat without abnormality.


Pulmonary: Respirations are comfortable, breath sounds are bilaterally clear 

and equal.


Cardiovascular: Heart rate and rhythm are normal, no murmur is auscultated. 

Perfusion is good with  quick capillary refill.


Abdomen: Soft without distention. No masses palpated.


: Normal male genitalia.


Neuro: Tone and behavior appropriate for gestational age.


Dermatology: Skin clear and free of rashes.


Extremities: Full range of motion, tone and behavior appropriate for 

gestational age.


Head Circumference:  29.5





Medications





 Current Medications


Multivitamins/ Vitamin C (Poly-Vi-Sol (Nicu)) 0.5 ml BID PO  Last administered 

on  08:25; Admin Dose 0.5 ML;  Start 6/3/17 at 09:45


Ferrous Sulfate (Juan-In-Sol 5 Mg/ 0.33 ml (Nicu)) 1.5 mg Q12 PO  Last 

administered on  08:25; Admin Dose 1.5 MG;  Start 17 at 21:00





Medical Decision Making


Assessment


1.  Fluids and nutrition: Weight today is 1740 g, increased by 50 g from 

yesterday.  Infant is on full feedings with fortified breastmilk 24 Alf at 34 

mL every 3 hours over 30 minutes.  Infant nippled 6 feedings ranging from 14-34 

mL.  Received 2 complete NG feedings and 4 partial NG feedings, completing 55% 

by bottle. and is tolerating well with intermittent residuals ranging from 0.2 

mL to 3 mL.  Total fluid intake 161 mL/kg per day, urine output 8, BM 1.  

There are no clinical signs of gastroesophageal reflux or NEC.  Abdominal 

examination is benign and infant is gaining weight.


2.  Respiratory:  History of retained lung fluid and bubble CPAP, to room air 

on .  Never had apnea, started on caffeine which was stopped on .  No 

apnea.


3.  Metabolic.  Initial magnesium 5.4, and low calcium asymptomatic improved.  

Stable Accu-Cheks.


4.  Heme:  Hematocrit was 38 platelets 510 on 


5.  Infection:  Congenital sepsis ruled out, antibiotics stopped after 2 days.


6.  GI/bili:  History of phototherapy, maximum bilirubin initial 9.4, rebound 

9.8.  Last bilirubin down to 6.4 on 6/3 and jaundice is clinically resolved.  

Blood type O+ Dena negative.


7.  CNS.  Normal tone and activity, stable temperature in incubator, normal 

neuro exam.  Head ultrasound on  normal.


8.  Cardiovascular: Hemodynamically stable


9.  Social.  Parents visiting and aware of the infant's clinical condition as 

well as the treatment plans





Today's Plan


Plan


Frequent monitoring of vital signs as well as pulse ox saturations and maintain 

greater than 90% and maintain neutral thermal environment.


Continue to monitor for desaturations as well as apnea prematurity.


Continue the present feedings and p.o. as tolerated and NG as needed, 

monitoring for gastroesophageal reflux.


Monitor weight gain.


Monitor hematocrit once in 2 weeks during hospitalization and monitor for 

anemia.


Repeat head ultrasound at 36 weeks of gestation to rule out PVL.


ROP examination at 4-6 weeks of age.


Ongoing parental support and teaching.











HERBERT NELSON NP 2017 10:30

## 2017-01-01 NOTE — PN
Date/Time of Note


Date/Time of Note


DATE: 17 


TIME: 08:44





Neonatology History


Date/Time


Admit Date/Time


May 25, 2017 at 20:50





Day of Life


Day of Life


5





History of Present Illness


HPI


 31-5/7 week first of twins 1469 g infant of gestational diabetic mother 

was  labor from 24-6/7 week, hypermagnesemia.  Presently 32-2/7     

week.  Baby required positive pressure ventilation and was subsequently placed 

on bubble CPAP transfer to the NICU.  Takenl off CPAP on  6 AM.  Started on 

caffeine on admission.


Rupture of membranes at  section, no fever, started on antibiotics 

empirically.


Feeding started tolerated with some residuals, on peripheral TPN.


Hyperbilirubinemia started on phototherapy on  .


At risk for problems related to prematurity such as apnea hyperbilirubinemia 

feeding intolerance and necrotizing enterocolitis intracranial hemorrhage 

retinopathy of prematurity and long-term neurodevelopmental problems.





Physical Exam


Vital Signs


Vitals





 Vital Signs








  Date Time  Temp Pulse Resp B/P Pulse Ox O2 Delivery O2 Flow Rate FiO2


 


17 08:00 99.0 152 52 72/43 100   


 


17 07:27  158 54  100   21


 


17 05:00 99.0 148 60  100   


 


17 03:00  141 77  100   21


 


17 02:00 98.6 144 52  100   





NPASS Score-Pain: 0





I&O/Weight


I&O


Daily Weight: 1310 grams, Daily Weight change from yesterday: 20.0 grams, 

Percent change from birth: -10.823, Weight based intake: 144.2176 mL/kg/day, 

Weight based output: 5.928 mL/kg/hr











I & O   


 


 17





 01:00 09:00 17:00


 


Intake Total 68.08 ml 66.06 ml 


 


Output Total 45.00 ml 61.00 ml 


 


Balance 23.08 ml 5.06 ml 


 


 Intake Detail   


 


IV Total 50.08 ml 36.06 ml 


 


Tube Feeding 18.0 ml 30.0 ml 


 


 Output Detail   


 


Urine Total 45.00 ml 61.00 ml 


 


Tube Feeding Residual Discard 0 ml 0 ml 


 


# Bowel Movements 1  


 


Daily Weight Change 20.0!^di  


 


Percent Weight Change from Birth -10.823 %  


 


Tube Feeding Gavage Duration 15 minutes 15 minutes 





 15 minutes 15 minutes 





  30 minutes 











Physical Exam


Pink no distress in incubator, room air, NG tube, peripheral IV in the left 

foot.  No distress.


Temperature 99 heart rate 152 respiration 52 blood pressure 72/43 mean 52.


Spring sutures normal, EENT normal


Chest clear breath sounds heart sounds normal no murmur


Abdomen soft no mass organomegaly or hernia cord stump dry


Extremities normal pulses and perfusion


Skin no lesions or rashes, no jaundice appreciated on phototherapy


CNS normal tone and activity.


Head Circumference:  29.5





Medications





 Current Medications


Total Parenteral Nutrition 250 ml @  5.6 mls/hr Q24H IV  Last administered on  15:33; Admin Dose 5.6 MLS/HR;  Start 17 at 13:00


Fat Emulsion Intravenous (Liposyn Ii 20% ([facility])) 23 ml @  0.96 mls/hr [id number] 

IV  Last administered on  15:32; Admin Dose 0.96 MLS/HR;  Start  at 16:00


Caffeine Citrated (Cafcit Liquid ([facility])) 8.8 mg Q24H PO ;  Start 17 at 22:

00;  Status UNV





Laboratory


Results 24 hrs





Laboratory Tests








Test


  17


17:07 17


04:00 17


04:41


 


Bedside Glucose 62  L  102  


 


Sodium Level  142   


 


Potassium Level  6.3  *H 


 


Chloride Level  117  H 


 


Carbon Dioxide Level  17  L 


 


Anion Gap  14   


 


Total Bilirubin  4.4   


 


Direct Bilirubin  0.10   


 


Indirect Bilirubin  4.3   











Medical Decision Making


Assessment


Day of life 5.  Postmenstrual rate 32-2/7 week.  Weight is 1310 up 20 g.


Medication caffeine citrate IV 8.8 mg.


Laboratory at good check 102 bilirubin 4.4/0.1 sodium 142 potassium 6.3 

hemolytic chloride 117 CO2 17.


1.  Fluids and nutrition the weight  1310 up 20 g.  Intake 144 mL/kg urine 5.9 

mL/kg/h stool 1.  Feeding tolerating breastmilk or special care 20 alf up to 

10 mL every 3 hours advancing every third feeding and tolerated.  The baby is 

on TPN dextrose 12% with 3 g/kg of lipids, the IV needs to be replaced every 

day.


2.  Respiratory.  History of bubble CPAP for transient support retained lung 

fluid, discontinued on .  Started on admission on caffeine, no apnea.


3.  Metabolic.  Initial magnesium 5.4.  Infant of gestational diabetic mom, Accu

-Cheks stable.  Electrolytes normal with hemolytic potassium.  Slightly low 

calcium asymptomatic and improved.


4.  Heme.  Hematocrit 51, platelets 247, on .


5.  Infection.  Congenital sepsis ruled out, antibiotics stopped after 2 days 

on .


6.  GI/bili.  On phototherapy now single, bilirubin maximum was 9.4 and is 

further down to 4.4/0.1.  Blood type O+ Dena negative.


7.  CNS normal tone and activity, maintaining temperature in incubator


8.  Cardiac.  Hemodynamically stable


9.  Social.  Parents visiting and updated.





Today's Plan


Plan


Stop phototherapy, bilirubin in a.m.


Advance feeding to 1 mL every other feeding.


Continue TPN support, decrease dextrose to decrease osmolality.  If feeding 

intolerance or persistent IV access difficulties may need PICC line.


Continue caffeine change to p.o., monitor for apnea


Head ultrasound at 1 week of age


Monitor for problems related to prematurity


Support parents with information and teaching.











CONNER DIAZ May 29, 2017 08:50

## 2017-01-01 NOTE — PN
[facility] LIVE HCIS


 


 


 


 


 Progress Note 


 


Patient Name: Jhonathan Loza Unit Number: [id number]


YOB: 2017 Patient Status: Admitted Inpatient


Attending Doctor: Mya Markham MD Account Number: [id number]


 


Edit: NAYANA HUTCHISON MD on 17 @ 15:35





I have seen and examined the baby and reviewed the care plan with the nurse 

practitioner.  Agree with exam, evaluation,


And treatment plan to continue same feeds, increase feeds per protocol and wean 

TPN as tolerated, monitor input, output


And weight closely, watch for clinical signs of necrotizing enterocolitis and 

gastroesophageal reflux, monitor for jaundice and 


continue phototherapy as needed and watch for clinical apnea and bradycardia 

and maintain oxygen saturations greater than 90%.


________________________________________________________________________________

_____


  


Date/Time of Note


Date/Time of Note


DATE: 17 


TIME: 13:00





Neonatology History


Date/Time


Admit Date/Time


May 25, 2017 at 20:50





Day of Life


Day of Life


8





History of Present Illness


HPI


 31-5/7 week first of twins 1469 g infant of gestational diabetic mother 

was  labor from 24-6/7 week, hypermagnesemia.  Presently 32-5/7     

week.  Baby required positive pressure ventilation and was subsequently placed 

on bubble CPAP transfer to the NICU.  Taken off CPAP on  6 AM.  Started on 

caffeine on admission.


Rupture of membranes at  section, no fever, started on antibiotics 

empirically.


Feeding started tolerated with some residuals, on peripheral TPN.


Hyperbilirubinemia started on phototherapy on  .


At risk for problems related to prematurity such as apnea hyperbilirubinemia 

feeding intolerance and necrotizing enterocolitis intracranial hemorrhage 

retinopathy of prematurity and long-term neurodevelopmental problems.





Physical Exam


Vital Signs


Vitals





 Vital Signs








  Date Time  Temp Pulse Resp B/P Pulse Ox O2 Delivery O2 Flow Rate FiO2


 


17 11:11  161 48  98   21


 


17 11:00  140 44  100   


 


17 08:00 98.6 156 60 61/34 100   


 


17 07:43  157 56  97   21





NPASS Score-Pain: 0





I&O/Weight


I&O


Daily Weight: 1330 grams, Daily Weight change from yesterday: -139 grams, 

Percent change from birth: -9.462, Weight based intake: 169.2481 mL/kg/day, 

Weight based output: 4.855 mL/kg/hr











I & O   


 


 17





 01:00 09:00 17:00


 


Intake Total 62.0 ml 80.9 ml 29.0 ml


 


Output Total 31.00 ml 56.00 ml 8.00 ml


 


Balance 31.00 ml 24.90 ml 21.00 ml


 


 Intake Detail   


 


IV Total 21.0 ml 15.9 ml 6.0 ml


 


Tube Feeding 41.0 ml 65.0 ml 23.0 ml


 


 Output Detail   


 


Urine Total 31.00 ml 56.00 ml 8.00 ml


 


Tube Feeding Residual Discard  0 ml 


 


# Bowel Movements 1 1 


 


Daily Weight Change -40.0!^di  -139 gms


 


Percent Weight Change from Birth -9.462 %  


 


Tube Feeding Gavage Duration 60 minutes 60 minutes 60 minutes





 60 minutes 60 minutes 





  60 minutes 











Physical Exam


[facility]


HEENT: [place] soft and flat. Eyes clear without drainage. Ears nose and 

throat without abnormality.


Pulmonary: Respirations are comfortable, breath sounds are bilaterally clear 

and equal.


Cardiovascular: Heart rate and rhythm are normal, no murmur is auscultated. 

Perfusion is good with  quick capillary refill.


Abdomen: Soft without distention. No masses palpated.


: Normal male genitalia.


Neuro: Tone and behavior appropriate for gestational age.


Dermatology: Skin clear and free of rashes.  Mild to moderate jaundice


Extremities: Full range of motion, tone and behavior appropriate for 

gestational age.


Head Circumference:  29.5





Medications





 Current Medications


Caffeine Citrated 8.8 mg 8.8 mg Q24H PO  Last administered on  22:56; 

Admin Dose 8.8 MG;  Start 17 at 22:00


Total Parenteral Nutrition (Tpn (Nicu)) 250 ml @  3.3 mls/hr Q24H IV  Last 

administered on  15:42; Admin Dose 3.3 MLS/HR;  Start 17 at 16:00





Laboratory


Results 24 hrs





Laboratory Tests








Test


  17


16:55 17


04:41 17


04:45


 


Bedside Glucose 95   74   


 


Sodium Level   140  


 


Potassium Level   5.4  H


 


Chloride Level   111  H


 


Carbon Dioxide Level   24  


 


Anion Gap   10  


 


Blood Urea Nitrogen   18  


 


Creatinine   0.61  


 


Glucose Level   66  L


 


Calcium Level   10.1  


 


Total Bilirubin   9.8  











Medical Decision Making


Assessment


1.  Growth and nutrition: Weight today is 1330 g, -40 g, 9% from birthweight.  

Infant is on increasing feedings with the fortified breastmilk 24 [place] or 

Similac special care 24 [place] at 23 mL over 30 minutes NG.  Feedings are being 

increased by 1 mL q. other feed.  Tolerating well with no significant 

residuals.  Abdominal examination is benign and there are no clinical signs of 

gastroesophageal reflux or NEC.  Infant is also being supplemented with TPN at 

1.8 mL/h with stable Chemstrips of 85-91.  Intake and output is adequate.  

Intralipids were discontinued on .


2.  Respiratory.  Initial bubble CPAP for transient support for retained lung 

fluid, was started on caffeine, changed to p.o. on .  No apnea.


3.  Metabolic.  Initial magnesium 5.4.  Accu-Cheks stable between (infant of 

diabetic mom).  Initial slightly low asymptomatic calcium, improved,, now 10.1 

on 


4.  Heme.  Hematocrit 51 platelets 247 .


5.  Infection.  Congenital sepsis ruled out, antibiotics stopped after 2 days 

on .


6.  GI/bili.  History of phototherapy maximum bilirubin was 9.4.  Blood type O+ 

Dena negative.  Bilirubin level on  is 9.4 and increased from 6.7 on .bili today  is 9.8


7.  CNS.  Normal tone and activity.  Temperature stable in incubator.CUS  

normal


8.  Cardiac.  Hemodynamic stable.


9.  Social.  Parents are visiting regularly and are aware of the infant's 

clinical condition as well as the treatment plans.





Today's Plan


Plan


Frequent monitoring of vital signs as well as pulse ox saturations and maintain 

greater than 90%.  Maintain neutral thermal environment.


Continue TPN and continue to increase feedings and wean off TPN maintaining 

total fluid intake at 150 mL/kg per day.


Monitor for clinical signs of gastroesophageal reflux and NEC.


Continue caffeine and monitor for apnea of prematurity.


Recheck bilirubin level in a.m. and restart phototherapy


Monitor for clinical signs of PDA.


Monitor for clinical signs of sepsis.


Ongoing parental support and teaching.











HERBERT NELSON NP 2017 13:03

## 2017-01-01 NOTE — PN
[facility] LIVE HCIS


 


 


 


 


 Progress Note 


 


Patient Name: Jhonathan Loza Unit Number: [id number]


YOB: 2017 Patient Status: Admitted Inpatient


Attending Doctor: Mya Markham MD Account Number: [id number]


 


Edit: PEYTON STEVEN MD on 17 @ 11:20





Infant examined, chart reviewed and case discussed with Herbert and NP as well as 

the bedside team.  This is a 14-day-old, 31.5 week premature infant with a 

corrected gestational age of 33.4 weeks.  Weight today is 1505 g increased by 

30 g.  Intake and output is adequate.  Physical examination shows infant in 

[place] responsive pink with essentially normal physical examination except 

for mild diaper rash and concur with a complete physical examination documented 

below.  Infant is on multivitamins and ferrous sulfate supplementation.  Infant 

is on full feedings with breastmilk 24 alf at 30 mL every 3 hours and is 

tolerating well.  Problem list as well as the care plans reviewed and agree 

with the complete problem list and care plans documented below.  Discussed with 

the bedside team.


________________________________________________________________________________

_____


  


Date/Time of Note


Date/Time of Note


DATE: 17 


TIME: 09:43





Neonatology History


Date/Time


Admit Date/Time


May 25, 2017 at 20:50





Day of Life


Day of Life


14





History of Present Illness


HPI


 31-5/7 week first of twins 1469 g infant of gestational diabetic mother 

was  labor from 24-6/7 week, hypermagnesemia.  Presently 33-4/7     

week.  Baby required positive pressure ventilation and was subsequently placed 

on bubble CPAP transfer to the NICU.  Taken off CPAP on  AM.  Started on 

caffeine on admission.caffeine dc'd 


Rupture of membranes at  section, no fever, started on antibiotics 

empirically.


Feeding started tolerated with some residuals, on peripheral TPN, dc'd /, 

Feeding per gavage 24 alf


Hyperbilirubinemia started on phototherapy on -, Head US  normal


At risk for problems related to prematurity such as apnea hyperbilirubinemia 

feeding intolerance and necrotizing enterocolitis intracranial hemorrhage 

retinopathy of prematurity and long-term neurodevelopmental problems.





Physical Exam


Vital Signs


Vitals





 Vital Signs








  Date Time  Temp Pulse Resp B/P Pulse Ox O2 Delivery O2 Flow Rate FiO2


 


17 08:00 98.6 165 34 78/51 99   


 


17 07:25  178 54  98   21


 


17 05:00 98.8 178 44  99   


 


17 03:00  157 58  100   21


 


17 02:00 98.2 154 40  100   





NPASS Score-Pain: 0





I&O/Weight


I&O


Daily Weight: 1505 grams, Daily Weight change from yesterday: 30.0 grams, 

Percent change from birth: 2.450, Weight based intake: 158.9403 mL/kg/day, 

Weight based output: 3.543 mL/kg/hr











I & O   


 


 17





 01:00 09:00 17:00


 


Intake Total 60.0 ml 90.0 ml 


 


Output Total 41.00 ml 10.00 ml 


 


Balance 19.00 ml 80.00 ml 


 


 Intake Detail   


 


Tube Feeding 60.0 ml 90.0 ml 


 


 Output Detail   


 


Urine Total 41.00 ml 10.00 ml 


 


Tube Feeding Residual Discard 0 ml 0 ml 


 


# Urine Diapers  1 


 


# Bowel Movements 1  


 


Daily Weight Change 30.0!^di  


 


Percent Weight Change from Birth 2.450 %  


 


Tube Feeding Gavage Duration 60 minutes 45 minutes 





 45 minutes 45 minutes 





  45 minutes 











Physical Exam


[facility]


HEENT: [place] soft and flat. Eyes clear without drainage. Ears nose and 

throat without abnormality.


Pulmonary: Respirations are comfortable, breath sounds are bilaterally clear 

and equal.


Cardiovascular: Heart rate and rhythm are normal, no murmur is auscultated. 

Perfusion is good with  quick capillary refill.


Abdomen: Soft without distention. No masses palpated.


: Normal male genitalia.


Neuro: Tone and behavior appropriate for gestational age.


Dermatology: Skin clear and free of rashes.


Extremities: Full range of motion, tone and behavior appropriate for 

gestational age.


Head Circumference:  29.5





Medications





 Current Medications


Multivitamins/ Vitamin C (Poly-Vi-Sol (Nicu)) 0.5 ml BID PO  Last administered 

on  08:22; Admin Dose 0.5 ML;  Start 6/3/17 at 09:45


Ferrous Sulfate (Juan-In-Sol 5 Mg/ 0.33 ml (Nicu)) 1.5 mg Q12 PO  Last 

administered on  08:22; Admin Dose 1.5 MG;  Start 17 at 21:00





Medical Decision Making


Assessment


1.  Fluids and nutrition.  The weight is 1505 up 305 g.  Intake 158 mL/kg urine 

4.3 mL/kg/h stool 4.  Tolerating feeding breast milk 24 alf or special care 24 

at 30 mL every 3 hours all by gavage.  IV was discontinued on 


2.  Respiratory.  Retained lung fluid, bubble CPAP treatment, went to room air 

on .  Started on caffeine, never had apnea.caffeine dc'd 


3.  Metabolic.  Initial magnesium 5.4, low calcium asymptomatic, improved.  

Stable Accu-Cheks.


4.  Heme.  Hematocrit 51 platelets 247 on 


5.  Infection.  Congenital sepsis ruled out, antibiotics stopped after 2 days.


6.  GI/bili.  History of phototherapy, maximum bilirubin initially 9.4 and up 

to 9.8 and a rebound after phototherapy.  Bilirubin is down to 6.4 on 6/3, and 

baby is not jaundiced.  Blood type O+ Dena negative.


7.  CNS.  Normal neuro exam, normal tone and activity, temperature stable in 

incubator.  Head ultrasound on  normal.


8.  Cardiovascular.  Hemodynamically stable.


9.  Social.  Parents visiting, and updated.





Today's Plan


Plan


Continue neutral thermal environment, monitor feeding tolerance and weight gain.


monitor for apnea off caffeine


continue Juan-In-Sol, monitor hemogram


Monitor for problems related to prematurity


Support parents with information and teaching..


CUS at 36 wks and ROP exam at 4 to 6 weeks











HERBERT NELSON NP 2017 09:45

## 2017-01-01 NOTE — PN
[facility] LIVE HCIS


 


 


 


 


 Progress Note 


 


Patient Name: Jhonathan Loza Unit Number: [id number]


YOB: 2017 Patient Status: Admitted Inpatient


Attending Doctor: Mya Markham MD Account Number: [id number]


 


Edit: CONNER DIAZ on 6/3/17 @ 12:32





Rounded with team, patient seen and discussed.  Continues to require gavage 

feeding support, incubator for neutral thermal environment, caffeine for apnea 

of prematurity


Agree with assessment and plans as per Herbert Torres  nurse 

practitioner.


________________________________________________________________________________

_____


  


Date/Time of Note


Date/Time of Note


DATE: 6/3/17 


TIME: 08:59





Neonatology History


Date/Time


Admit Date/Time


May 25, 2017 at 20:50





Day of Life


Day of Life


10





History of Present Illness


HPI


 31-5/7 week first of twins 1469 g infant of gestational diabetic mother 

was  labor from 24-6/7 week, hypermagnesemia.  Presently 33-0/7     

week.  Baby required positive pressure ventilation and was subsequently placed 

on bubble CPAP transfer to the NICU.  Taken off CPAP on  AM.  Started on 

caffeine on admission.


Rupture of membranes at  section, no fever, started on antibiotics 

empirically.


Feeding started tolerated with some residuals, on peripheral TPN,dc'd 


Hyperbilirubinemia started on phototherapy on -, CUS  normal


At risk for problems related to prematurity such as apnea hyperbilirubinemia 

feeding intolerance and necrotizing enterocolitis intracranial hemorrhage 

retinopathy of prematurity and long-term neurodevelopmental problems.





Physical Exam


Vital Signs


Vitals





 Vital Signs








  Date Time  Temp Pulse Resp B/P Pulse Ox O2 Delivery O2 Flow Rate FiO2


 


6/3/17 08:08 98.4 148 38  99   


 


6/3/17 07:22  150 54  99   21


 


6/3/17 05:00 98.6 165 50 76/56 99   


 


6/3/17 03:05  146 46  100   21


 


6/3/17 02:00 98.2 137 39  99   





NPASS Score-Pain: 0





I&O/Weight


I&O


Daily Weight: 1415 grams, Daily Weight change from yesterday: 45.0 grams, 

Percent change from birth: -3.675, Weight based intake: 152.3809 mL/kg/day, 

Weight based output: 4.282 mL/kg/hr











I & O   


 


 6/3/17 6/3/17 6/3/17





 01:00 09:00 17:00


 


Intake Total 56.0 ml 84.0 ml 


 


Output Total 27.00 ml 56.00 ml 


 


Balance 29.00 ml 28.00 ml 


 


 Intake Detail   


 


Tube Feeding 56.0 ml 84.0 ml 


 


 Output Detail   


 


Urine Total 27.00 ml 56.00 ml 


 


Tube Feeding Residual Discard 0 ml 0 ml 


 


# Bowel Movements 1 1 


 


Daily Weight Change 45.0!^di  


 


Percent Weight Change from Birth -3.675 %  


 


Tube Feeding Gavage Duration 60 minutes 60 minutes 





 60 minutes 60 minutes 





  60 minutes 











Physical Exam


Active and alert in [facility] [place].


HEENT: [place] soft and flat. Eyes clear without drainage. Ears nose and 

throat without abnormality.


Pulmonary: Respirations are comfortable, breath sounds are bilaterally clear 

and equal.


Cardiovascular: Heart rate and rhythm are normal, no murmur is auscultated. 

Perfusion is good with  quick capillary refill.


Abdomen: Soft without distention. No masses palpated.  Umbilical stump dry 

without redness


: Normal male genitalia.


Neuro: Tone and behavior appropriate for gestational age.


Dermatology: Skin clear and free of rashes.


Extremities: Full range of motion, tone and behavior appropriate for 

gestational age.


Head Circumference:  29.5





Medications





 Current Medications


Caffeine Citrated (Cafcit Liquid (Nicu)) 8.8 mg Q24H PO  Last administered on t 22:09; Admin Dose 8.8 MG;  Start 17 at 22:00





Laboratory


Results 24 hrs





Laboratory Tests








Test


  6/3/17


05:00 6/3/17


05:03


 


Total Bilirubin 6.4   


 


Bedside Glucose  65  L











Medical Decision Making


Assessment


1.  Growth and nutrition: Weight today is 1425 g,  up 45 g,  Infant is on full 

volume feedings with the fortified breastmilk 24 [place] or Similac special care 24 

[place] at 28 mL over 60 minutes NG with mild residuals of 1-3 mL.  Abdominal 

examination is benign and there are no clinical signs of gastroesophageal 

reflux or NEC.  TPN DC'd 6 2.  Chemstrips 65.  Intake and output is adequate 

with urine output of 4.3 mils per KG per hour, stool past 23.  Intake is been 

152 mL's per KG per day.


2.  Respiratory.  Initial bubble CPAP for transient support for retained lung 

fluid, was started on caffeine, changed to p.o. on .  No apnea.


3.  Metabolic.  Initial magnesium 5.4.  Initial slightly low asymptomatic 

calcium, improved,, now 10.1 on 


4.  Heme.  Hematocrit 51 platelets 247 .


5.  Infection.  Congenital sepsis ruled out, antibiotics stopped after 2 days 

on .


6.  GI/bili.  History of phototherapy maximum bilirubin was 9.4.  Blood type O+ 

Dena negative.  Bilirubin level on  is 9.4 and increased from 6.7 on .bili   is 9.8, bili is 6 on 


7.  CNS.  Normal tone and activity.  Temperature stable in incubator.CUS  

normal


8.  Cardiac.  Hemodynamic stable.


9.  Social.  Parents are visiting regularly and are aware of the infant's 

clinical condition as well as the treatment plans.





Today's Plan


Plan


Frequent monitoring of vital signs as well as pulse ox saturations and maintain 

greater than 90%.  Maintain neutral thermal environment.


continue gavage feedings 


Monitor for clinical signs of gastroesophageal reflux and NEC.


Continue caffeine and monitor for apnea of prematurity.


Recheck bilirubin level in a.m.


Monitor for clinical signs of PDA.


Monitor for clinical signs of sepsis.


Ongoing parental support and teaching.


CUS at 36 wks and ROP exam at 4 to 6 weeks











HERBERT TORRES NP Humza 3, 2017 09:05

## 2017-01-01 NOTE — PN
[facility] LIVE HCIS


 


 


 


 


 Progress Note 


 


Patient Name: Jhonathan Loza Unit Number: [id number]


YOB: 2017 Patient Status: Admitted Inpatient


Attending Doctor: Mya Markham MD Account Number: [id number]


 


Edit: NAYANA HUTCHISON MD on 17 @ 14:08





I have seen and examined the baby and reviewed the care plan with the nurse 

practitioner.  Agree with exam, evaluation,


And treatment plan to continue same feeds, monitor input, output and weight 

closely.  Watch for clinical signs of NEC and


GERD, watch for clinical apnea and bradycardia and maintain oxygen saturations 

greater than 90% and monitor for jaundice 


and monitor hematocrit every 1-2 weeks during the hospital stay


________________________________________________________________________________

_____


  


Date/Time of Note


Date/Time of Note


DATE: 17 


TIME: 10:02





Neonatology History


Date/Time


Admit Date/Time


May 25, 2017 at 20:50





Day of Life


Day of Life


13





History of Present Illness


HPI


 31-5/7 week first of twins 1469 g infant of gestational diabetic mother 

was  labor from 24-6/7 week, hypermagnesemia.  Presently 33-3/7     

week.  Baby required positive pressure ventilation and was subsequently placed 

on bubble CPAP transfer to the NICU.  Taken off CPAP on  AM.  Started on 

caffeine on admission.caffeine dc'd 


Rupture of membranes at  section, no fever, started on antibiotics 

empirically.


Feeding started tolerated with some residuals, on peripheral TPN, dc'd , 

Feeding per gavage 24 alf


Hyperbilirubinemia started on phototherapy on -, Head US  normal


At risk for problems related to prematurity such as apnea hyperbilirubinemia 

feeding intolerance and necrotizing enterocolitis intracranial hemorrhage 

retinopathy of prematurity and long-term neurodevelopmental problems.





Physical Exam


Vital Signs


Vitals





 Vital Signs








  Date Time  Temp Pulse Resp B/P Pulse Ox O2 Delivery O2 Flow Rate FiO2


 


17 08:00 98.4 145 46 66/41 100   


 


17 07:39  162 58  100   21


 


17 05:00 98.8 162 44  100   


 


17 03:00  155 48  100   21





NPASS Score-Pain: 0





I&O/Weight


I&O


Daily Weight: 1475 grams, Daily Weight change from yesterday: 25.0 grams, 

Percent change from birth: 0.408, Weight based intake: 156.7567 mL/kg/day, 

Weight based output: 3.305 mL/kg/hr











I & O   


 


 17





 01:00 09:00 17:00


 


Intake Total 58.0 ml 88.0 ml 


 


Output Total 27.00 ml 67.00 ml 


 


Balance 31.00 ml 21.00 ml 


 


 Intake Detail   


 


Tube Feeding 58.0 ml 88.0 ml 


 


 Output Detail   


 


Urine Total 27.00 ml 67.00 ml 


 


Tube Feeding Residual Discard 0 ml 0 ml 


 


# Bowel Movements 1  


 


Daily Weight Change 25.0!^di  


 


Percent Weight Change from Birth 0.408 %  


 


Tube Feeding Gavage Duration 60 minutes 60 minutes 





 60 minutes 60 minutes 





  60 minutes 











Physical Exam


Active and alert in [facility] [place].


HEENT: [place] soft and flat. Eyes clear without drainage. Ears nose and 

throat without abnormality.


Pulmonary: Respirations are comfortable, breath sounds are bilaterally clear 

and equal.


Cardiovascular: Heart rate and rhythm are normal, no murmur is auscultated. 

Perfusion is good with  quick capillary refill.


Abdomen: Soft without distention. No masses palpated.


: Normal male genitalia.


Neuro: Tone and behavior appropriate for gestational age.


Dermatology: Skin clear and free of rashes.


Extremities: Full range of motion, tone and behavior appropriate for 

gestational age.


Head Circumference:  29.5





Medications





 Current Medications


Multivitamins/ Vitamin C (Poly-Vi-Sol (Nicu)) 0.5 ml BID PO  Last administered 

on  09:10; Admin Dose 0.5 ML;  Start 6/3/17 at 09:45


Ferrous Sulfate (Juan-In-Sol 5 Mg/ 0.33 ml (Nicu)) 1.5 mg Q12 PO  Last 

administered on  09:10; Admin Dose 1.5 MG;  Start 17 at 21:00





Medical Decision Making


Assessment


1.  Fluids and nutrition.  The weight is 1475 up 25 g.  Intake 157 mL/kg urine 

4.3 mL/kg/h stool 4.  Tolerating feeding breast milk 24 alf or special care 24 

at 30 mL every 3 hours all by gavage.  IV was discontinued on , has started 

to gain weight


2.  Respiratory.  Retained lung fluid, bubble CPAP treatment, went to room air 

on .  Started on caffeine, change to p.o. on , never had apnea.caffeine 

dc'd 


3.  Metabolic.  Initial magnesium 5.4, low calcium asymptomatic, improved.  

Stable Accu-Cheks.


4.  Heme.  Hematocrit 51 platelets 247 on 


5.  Infection.  Congenital sepsis ruled out, antibiotics stopped after 2 days.


6.  GI/bili.  History of phototherapy, maximum bilirubin initially 9.4 and up 

to 9.8 and a rebound after phototherapy.  Bilirubin is down to 6.4 on 6/3, and 

baby is not jaundiced.  Blood type O+ Dena negative.


7.  CNS.  Normal neuro exam, normal tone and activity, temperature stable in 

incubator.  Head ultrasound on  normal.


8.  Cardiovascular.  Hemodynamically stable.


9.  Social.  Parents visiting, and updated.





Today's Plan


Plan


Continue neutral thermal environment, monitor feeding tolerance and weight gain.


monitor for apnea off caffeine


continue Juan-In-Sol, monitor hemogram


Monitor for problems related to prematurity


Support parents with information and teaching..











HERBERT NELSON NP 2017 10:05

## 2017-01-01 NOTE — PN
Date/Time of Note


Date/Time of Note


DATE: 17 


TIME: 10:18





Neonatology History


Date/Time


Admit Date/Time


May 25, 2017 at 20:50





Day of Life


Day of Life


7





History of Present Illness


HPI


 31-5/7 week first of twins 1469 g infant of gestational diabetic mother 

was  labor from 24-6/7 week, hypermagnesemia.  Presently 32-4/7     

week.  Baby required positive pressure ventilation and was subsequently placed 

on bubble CPAP transfer to the NICU.  Taken off CPAP on  6 AM.  Started on 

caffeine on admission.


Rupture of membranes at  section, no fever, started on antibiotics 

empirically.


Feeding started tolerated with some residuals, on peripheral TPN.


Hyperbilirubinemia started on phototherapy on  .


At risk for problems related to prematurity such as apnea hyperbilirubinemia 

feeding intolerance and necrotizing enterocolitis intracranial hemorrhage 

retinopathy of prematurity and long-term neurodevelopmental problems.





Physical Exam


Vital Signs


Vitals





 Vital Signs








  Date Time  Temp Pulse Resp B/P Pulse Ox O2 Delivery O2 Flow Rate FiO2


 


17 08:00 98.4 140 52 71/41 100   


 


17 07:26  152 54  99   21


 


17 05:00 98.6 148 66  100   


 


17 03:03  151 48  100   21





NPASS Score-Pain: 0





I&O/Weight


I&O


Daily Weight: 1370 grams, Daily Weight change from yesterday: -10.0 grams, 

Percent change from birth: -6.739, Weight based intake: 153.6734 mL/kg/day, 

Weight based output: 3.658 mL/kg/hr; BM 2











I & O   


 


 17





 00:59 08:59 16:59


 


Intake Total 80.2 ml 80.9 ml 3.3 ml


 


Output Total 35.00 ml 72.50 ml 


 


Balance 45.20 ml 8.40 ml 3.3 ml


 


 Intake Detail   


 


IV Total 31.2 ml 27.9 ml 3.3 ml


 


Tube Feeding 49.0 ml 53.0 ml 


 


 Output Detail   


 


Urine Total 35.00 ml 72.00 ml 


 


Blood Draw  0.5 ml 


 


# Bowel Movements  2 


 


Daily Weight Change -10.0!^di  


 


Percent Weight Change from Birth -6.739 %  


 


Tube Feeding Gavage Duration 30 minutes 30 minutes 





 30 minutes 30 minutes 





 30 minutes 30 minutes 











Physical Exam


Infant in [place], responsive, pink, comfortable with TPN infusing and go 

watch feedings


HEENT: Anterior fontanelle soft and flat, eyes no congestion no discharge, ENT 

within normal limits with NG tube in place


Cardiovascular: Rate and rhythm regular, no murmurs, peripheral perfusion is 

adequate.


Pulmonary: Equal breath sounds, good aches air exchange, clear with no 

retractions and normal work of breathing.


Abdomen: Soft, round, nondistended, normal bowel sounds, no masses palpable, 

nontender, cord is dry and periumbilical region is normal


Extremities: Adequate range of motion with good perfusion


Neurology: Normal tone and activity for gestational age


Skin: No rashes and mild jaundice.


Head Circumference:  28.5





Medications





 Current Medications


Caffeine Citrated 8.8 mg 8.8 mg Q24H PO  Last administered on  21:52; 

Admin Dose 8.8 MG;  Start 17 at 22:00


Total Parenteral Nutrition (Tpn (Nicu)) 250 ml @  4.2 mls/hr Q24H IV  Last 

administered on  14:12; Admin Dose 4.2 MLS/HR;  Start 17 at 16:00





Laboratory


Results 24 hrs





Laboratory Tests








Test


  17


17:08 17


04:42 17


04:50


 


Bedside Glucose 91   85   


 


Total Bilirubin   9.4  #











Medical Decision Making


Assessment


1.  Growth and nutrition: Weight today is 1370 g, -10 g, -6.7% from 

birthweight.  Infant is on increasing feedings with the fortified breastmilk 24 

[place] or Similac special care 24 [place] at 18 mL over 30 minutes NG.  Feedings are 

being increased by 1 mL q. other feed.  Tolerating well with no significant 

residuals.  Abdominal examination is benign and there are no clinical signs of 

gastroesophageal reflux or NEC.  Infant is also being supplemented with TPN at 

3.3 mL/h with stable Chemstrips of 85-91.  Intake and output is adequate.  

Intralipids were discontinued on .


2.  Respiratory.  Initial bubble CPAP for transient support for retained lung 

fluid, was started on caffeine, changed to p.o. on .  No apnea.


3.  Metabolic.  Initial magnesium 5.4.  Accu-Cheks stable between (infant of 

diabetic mom).  Initial slightly low asymptomatic calcium, improved.


4.  Heme.  Hematocrit 51 platelets 247 .


5.  Infection.  Congenital sepsis ruled out, antibiotics stopped after 2 days 

on .


6.  GI/bili.  History of phototherapy maximum bilirubin was 9.4.  Blood type O+ 

Dena negative.  Bilirubin level on  is 9.4 and increased from 6.7 on .


7.  CNS.  Normal tone and activity.  Temperature stable in incubator.


8.  Cardiac.  Hemodynamic stable.


9.  Social.  Parents are visiting regularly and are aware of the infant's 

clinical condition as well as the treatment plans.





Today's Plan


Plan


Frequent monitoring of vital signs as well as pulse ox saturations and maintain 

greater than 90%.  Maintain neutral thermal environment.


Continue TPN and continue to increase feedings and wean off TPN maintaining 

total fluid intake at 150 mL/kg per day.


Monitor for clinical signs of gastroesophageal reflux and NEC.


Continue caffeine and monitor for apnea of prematurity.


Recheck bilirubin level in a.m. and consider phototherapy if bilirubin 

continues to increase.


Check a head ultrasound on day 7 of life.


Monitor for clinical signs of PDA.


Monitor for clinical signs of sepsis.


Ongoing parental support and teaching.











PEYTON STEVEN MD May 31, 2017 10:28

## 2017-01-01 NOTE — PDOCDIS
NICU Discharge Instructions


Pediatrician Information


Clinic Information


follow up with Dr. Nir Iyer on monday 6/19








Follow-up with Physician:   2





 Day/Days











Diet


Feeding Instructions:  Breast Feed Ad LibNICU Formula:  Similac Expert care 

Neosure 22cal


Comment


fortify breast milk to 22 calorie using neosure powder for 3 months post 

discharge





Referrals


Referrals :  


   Agency Name and Phone Number:   [phone] eye exam in 1-2 weeks











HERBERT NELSON NP Jun 17, 2017 09:16

## 2017-01-01 NOTE — PN
Date/Time of Note


Date/Time of Note


DATE: 17 


TIME: 10:48





Neonatology History


Date/Time


Admit Date/Time


May 25, 2017 at 20:50





Day of Life


Day of Life


18





History of Present Illness


HPI


 31-5/7 week first of twins 1469 g infant of gestational diabetic mother 

was  labor from 24-6/7 week, hypermagnesemia.  Presently 34-1/7     

week.  Baby required positive pressure ventilation and was subsequently placed 

on bubble CPAP transfer to the NICU.  Taken off CPAP on  AM.  Started on 

caffeine on admission, caffeine dc'd 


Rupture of membranes at  section, no fever, started on antibiotics 

empirically.


Feeding started tolerated with some residuals, on peripheral TPN, dc'd , 

Feeding per gavage 24 alf


Hyperbilirubinemia started on phototherapy on -, Head US  normal


At risk for problems related to prematurity such as apnea hyperbilirubinemia 

feeding intolerance and necrotizing enterocolitis intracranial hemorrhage 

retinopathy of prematurity and long-term neurodevelopmental problems.





Physical Exam


Vital Signs


Vitals





 Vital Signs








  Date Time  Temp Pulse Resp B/P Pulse Ox O2 Delivery O2 Flow Rate FiO2


 


17 08:00 98.4 139 48 73/57 100   


 


17 07:18  146 52  100   21


 


17 05:00 98.4 133 55  100   


 


17 03:08  155 45  99   21





NPASS Score-Pain: 0





I&O/Weight


I&O


Daily Weight: 1650 grams, Daily Weight change from yesterday: 50.0 grams, 

Percent change from birth: 12.321, Weight based intake: 155.1515 mL/kg/day, 

urine output 8, BM 0








I & O   


 


 17





 01:00 09:00 17:00


 


Intake Total 64.0 ml 96.0 ml 


 


Output Total 0 ml 0 ml 


 


Balance 64.0 ml 96.0 ml 


 


 Intake Detail   


 


Bottle 9 ml 17 ml 


 


Tube Feeding 55.0 ml 79.0 ml 


 


 Output Detail   


 


Tube Feeding Residual Discard 0 ml 0 ml 


 


# Urine Diapers 2 3 


 


# Bowel Movements  1 


 


Daily Weight Change 50.0!^di  


 


Percent Weight Change from Birth 12.321 %  


 


Tube Feeding Gavage Duration 30 minutes 25 minutes 





 30 minutes 30 minutes 





  30 minutes 











Physical Exam


Infant in [place], pink, comfortable in room air


HEENT: Anterior fontanelle soft and flat, eyes no congestion or discharge, ENT 

within normal limits with NG tube in place


Cardiovascular: Rate and rhythm regular, no murmurs, peripheral perfusion is 

adequate


Pulmonary: Equal breath sounds, good air exchange, clear with no retractions 

and normal work of breathing


Abdomen: Soft, nondistended, round, normal bowel sounds, no masses palpable, 

nontender


Genitalia: Normal  male


Neurology: Normal tone and activity for gestational age


Extremities: Adequate range of motion with good perfusion


Skin: Mild perianal erythema and no rashes


Head Circumference:  29.5





Medications





 Current Medications


Multivitamins/ Vitamin C (Poly-Vi-Sol (Nicu)) 0.5 ml BID PO  Last administered 

on  08:42; Admin Dose 0.5 ML;  Start 6/3/17 at 09:45


Ferrous Sulfate (Juan-In-Sol 5 Mg/ 0.33 ml (Nicu)) 1.5 mg Q12 PO  Last 

administered on  08:42; Admin Dose 1.5 MG;  Start 17 at 21:00





Medical Decision Making


Assessment


1.  Fluids and nutrition: Weight today is 1650 g, increased by 50 g from 

yesterday.  Infant is on full feedings with fortified breastmilk 24 Alf at 32 

mL every 3 hours over 30 minutes.  Infant nippled for feedings ranging from 9-

17 mL.  Received for complete NG feedings and for partial NG feedings and is 

tolerating well with intermittent residuals ranging from 0.2 mL to 3 mL.  Total 

fluid intake 1 55 mL/kg per day, urine output 8, BM 0.  There are no clinical 

signs of gastroesophageal reflux or NEC.  Abdominal examination is benign and 

infant is gaining weight.


2.  Respiratory:  History of retained lung fluid and bubble CPAP, to room air 

on .  Never had apnea, started on caffeine which was stopped on .  No 

apnea.


3.  Metabolic.  Initial magnesium 5.4, and low calcium asymptomatic improved.  

Stable Accu-Cheks.


4.  Heme:  Hematocrit was 51 platelets 247 on .


5.  Infection:  Congenital sepsis ruled out, antibiotics stopped after 2 days.


6.  GI/bili:  History of phototherapy, maximum bilirubin initial 9.4, rebound 

tenderness 9.8.  Last bilirubin down to 6.4 on 6/3 and jaundice is clinically 

resolved.  Blood type O+ Dena negative.


7.  CNS.  Normal tone and activity, stable temperature in incubator, normal 

neuro exam.  Head ultrasound on  normal.


8.  Cardiovascular: Hemodynamically stable


9.  Social.  Parents visiting and aware of the infant's clinical condition as 

well as the treatment plans





Today's Plan


Plan


Frequent monitoring of vital signs as well as pulse ox saturations and maintain 

greater than 90% and maintain neutral thermal environment.


Continue to monitor for desaturations as well as apnea prematurity.


Continue the present feedings and p.o. as tolerated and NG as needed monitoring 

for gastroesophageal reflux.


Monitor weight gain.


Monitor hematocrit once in 2 weeks during hospitalization and monitor for 

anemia.


Repeat head ultrasound at 36 weeks of gestation to rule out PVL.


ROP examination at 4-6 weeks of age.


Ongoing parental support and teaching.











PEYTON STEVEN MD 2017 10:53

## 2017-01-01 NOTE — PN
Date/Time of Note


Date/Time of Note


DATE: 17 


TIME: 11:37





Neonatology History


Date/Time


Admit Date/Time


May 25, 2017 at 20:50





Day of Life


Day of Life


23





History of Present Illness


HPI


 31-5/7 week first of twins 1469 g infant of gestational diabetic mother 

was  labor from 24-6/7 week, hypermagnesemia.  Presently 34-6/7     

week.  Baby required positive pressure ventilation and was subsequently placed 

on bubble CPAP transfer to the NICU.  Taken off CPAP on  AM.  Started on 

caffeine on admission, caffeine dc'd 


Rupture of membranes at  section, no fever, started on antibiotics 

empirically.


Feeding started tolerated with some residuals, on peripheral TPN, dc'd , 

Feeding  24 alf,nippling improving


Hyperbilirubinemia started on phototherapy on -, Head US  normal


At risk for problems related to prematurity such as apnea hyperbilirubinemia 

feeding intolerance and necrotizing enterocolitis intracranial hemorrhage 

retinopathy of prematurity and long-term neurodevelopmental problems.





Physical Exam


Vital Signs


Vitals





 Vital Signs








  Date Time  Temp Pulse Resp B/P Pulse Ox O2 Delivery O2 Flow Rate FiO2


 


17 11:19  147 49  100   21


 


17 11:00 98.6 142 60  100   


 


17 08:00 98.6 150 57 64/40 100   


 


17 07:31  190 53  98   21


 


17 05:30 98.4 160 56  99   





NPASS Score-Pain: 0





I&O/Weight


I&O


Daily Weight: 1820 grams, Daily Weight change from yesterday: 20.0 grams, 

Percent change from birth: 23.893, Weight based intake: 164.8351 mL/kg/day, 

Weight based output: 0 mL/kg/hr











I & O   


 


 17





 01:00 09:00 17:00


 


Intake Total 116 ml 117 ml 40 ml


 


Balance 116 ml 117 ml 40 ml


 


 Intake Detail   


 


Bottle 116 ml 117 ml 40 ml


 


 Output Detail   


 


Breastfeeding Duration 7 minutes  


 


# Urine Diapers 3 3 1


 


# Bowel Movements 2  1


 


Daily Weight Change 20.0!^di  


 


Percent Weight Change from Birth 23.893 %  











Physical Exam


Pink no distress in room air, open crib


Temperature 98.6 heart rate 147 respiration 49 blood pressure 64/40 m 46.


[place] sutures normal EENT normal neck no mass


Chest no retractions clear breath sounds heart sounds normal no murmur


Abdomen soft no mass organomegaly or hernia


Extremities normal perfusion and pulses


Genitalia normal genitalia male testes descended


Neuro normal tone and activity


Skin no lesions or rashes.


Head Circumference:  31.0





Medications





 Current Medications


Multivitamins/ Vitamin C (Poly-Vi-Sol (Nicu)) 0.5 ml BID PO  Last administered 

on  08:01; Admin Dose 0.5 ML;  Start 6/3/17 at 09:45


Ferrous Sulfate (Juan-In-Sol 5 Mg/ 0.33 ml (Nicu)) 3.6 mg DAILY PO  Last 

administered on  08:01; Admin Dose 3.6 MG;  Start 6/15/17 at 09:00





Medical Decision Making


Assessment


Day of life 23.  Postmenstrual age 34-6/7 week.  Weight is 1820 up 20 g.


Medication Juan-In-Sol Poly-Vi-Sol





1.  Fluids and nutrition.  Weight is 1820 up 20 g.  Intake 164 mL/kg urine 8 

stool 2.  Baby took feeding all p.o. in the last 24 hours and is in open crib.

  Feeding is still 24 alf breast milk or special care 24.


2.  Respiratory.  History of retained lung fluid and bubble CPAP to room air on 

.  Never had apnea.  Initially was started on caffeine when on CPAP and 

this was stopped on .


3.  Metabolic.  Instrument initial magnesium 5.4, low calcium asymptomatic 

improved.  Stable Accu-Cheks.


4.  Heme.  Last hematocrit is 38 on .  The baby is on Juan-In-Sol and Poly-Vi

-Sol





5.  Infection.  Congenital sepsis ruled out, antibiotics stopped after 2 days.


6.  GI/bili.  History of phototherapy maximum bilirubin 9.4, and subsequently 

9.8 in the rebound.  Jaundice clinically resolved.  Blood type is O+ Dena 

negative.


7.  CNS.  Normal neuro exam.  Maintaining temperature now in open crib.  Normal 

head ultrasound on .


8.  Cardiovascular.  Hemodynamically stable





9.  Social.  Parents are involved and updated





Today's Plan


Plan


Decrease caloric density and encourage breast-feeding, supplementation with 

NeoSure 22 alf, ad koko. feeding


Hepatitis B vaccine


Preparation for discharge possibly in the next few days.


Consider repeat head ultrasound before discharge for PVL screening


Monitor for problems related to prematurity


Support parents with information and teaching


ROP exam at 4-6 weeks of age











CONNER DIAZ 2017 11:50

## 2017-01-01 NOTE — PN
Date/Time of Note


Date/Time of Note


DATE: 17 


TIME: 09:45





Neonatology History


Date/Time


Admit Date/Time


May 25, 2017 at 20:50





Day of Life


Day of Life


11





History of Present Illness


HPI


 31-5/7 week first of twins 1469 g infant of gestational diabetic mother 

was  labor from 24-6/7 week, hypermagnesemia.  Presently 33-1/7     

week.  Baby required positive pressure ventilation and was subsequently placed 

on bubble CPAP transfer to the NICU.  Taken off CPAP on  AM.  Started on 

caffeine on admission.


Rupture of membranes at  section, no fever, started on antibiotics 

empirically.


Feeding started tolerated with some residuals, on peripheral TPN, dc'd 


Hyperbilirubinemia started on phototherapy on -, Head US  normal


At risk for problems related to prematurity such as apnea hyperbilirubinemia 

feeding intolerance and necrotizing enterocolitis intracranial hemorrhage 

retinopathy of prematurity and long-term neurodevelopmental problems.





Physical Exam


Vital Signs


Vitals





 Vital Signs








  Date Time  Temp Pulse Resp B/P Pulse Ox O2 Delivery O2 Flow Rate FiO2


 


17 08:00 98.2 168 48 73/37 100   


 


17 07:19  160 66  98   21


 


17 05:00 98.2 165 59  100   


 


17 03:03  162 62  100   21


 


17 02:00 98.2 157 31  100   





NPASS Score-Pain: 0





I&O/Weight


I&O


Daily Weight: 1405 grams, Daily Weight change from yesterday: -10.0 grams, 

Percent change from birth: -4.356, Weight based intake: 152.3809 mL/kg/day, 

Weight based output: 3.630 mL/kg/hr











I & O   


 


 17





 01:00 09:00 17:00


 


Intake Total 84.0 ml 84.0 ml 


 


Output Total 33.00 ml 66.00 ml 


 


Balance 51.00 ml 18.00 ml 


 


 Intake Detail   


 


Tube Feeding 84.0 ml 84.0 ml 


 


 Output Detail   


 


Urine Total 33.00 ml 66.00 ml 


 


Tube Feeding Residual Discard 0 ml 0 ml 


 


# Bowel Movements 1 1 


 


Daily Weight Change -10.0!^di  


 


Percent Weight Change from Birth -4.356 %  


 


Tube Feeding Gavage Duration 60 minutes 60 minutes 





 60 minutes 60 minutes 





 60 minutes 60 minutes 











Physical Exam


Pink no distress in incubator, room air, NG tube.


Temperature 98.2 heart rate 168 respiration 48 blood pressure 73/37 mean 50


[place] sutures normal eyes ears nose throat without abnormality neck no mass


Chest no retractions clear breath sounds heart sounds normal no murmur


Abdomen soft no mass organomegaly or hernia


Genitalia normal  male testes descended


Extremities normal perfusion and pulses


Skin no lesions or rashes no jaundice


CNS normal neuro exam normal tone and activity.


Head Circumference:  29.5





Medications





 Current Medications


Caffeine Citrated (Cafcit Liquid (Nicu)) 8.8 mg Q24H PO  Last administered on 6/

3/17at 21:50; Admin Dose 8.8 MG;  Start 17 at 22:00


Multivitamins/ Vitamin C (Poly-Vi-Sol ([facility])) 0.5 ml BID PO  Last administered 

on  08:15; Admin Dose 0.5 ML;  Start 6/3/17 at 09:45





Medical Decision Making


Assessment


Day of life 11.  Postmenstrual rate 33-1/ week.  Weight is 1405 down 10 g.


Medication caffeine citrate 8.8 mg daily p.o.  Poly-Vi-Sol 0.5 mL every 12 

hours.


1.  Fluids and nutrition.  The weight is 1405 down 10 g.  Intake 152 mL/kg 

urine 3.6 mL/kg/h stool 4.  Tolerating feeding breast milk 24 alf or special 

care 24 at 28 mL every 3 hours all by gavage.  The baby lost little weight IV 

was discontinued only recently on .


2.  Respiratory.  Retained lung fluid with bubble CPAP treatment, went to room 

air on . Also started on caffeine which was changed to p.o. on .  Baby 

has no apnea.


3.  Metabolic.  Initial magnesium 5.4, and low calcium asymptomatic which was 

improved.  Accu-Cheks were stable.


4.  Heme.  Hematocrit 51 platelets 247 on .


5.  Infection.  Congenital sepsis ruled out, antibiotics stopped after 2 days.


6.  GI/bili.  History of phototherapy, maximum bilirubin initially 9.4 and up 

to 9.8 and a rebound after phototherapy.  Bilirubin is down to 6.4 on 6/3, and 

baby is not jaundiced.  Blood type O+ Dena negative.


7.  CNS.  Normal neuro exam, normal tone and activity, temperature stable in 

incubator.  Head ultrasound on  normal.


8.  Cardiovascular.  Hemodynamically stable.


9.  Social.  Parents are visiting and have been updated.





Today's Plan


Plan


Continue neutral thermal environment, monitor weight gain and feeding tolerance

, may need to go to a higher caloric density if not gaining weight.


Start Juan-In-Sol at 2 weeks of age, monitor hemogram


Monitor jaundice clinically


Continue caffeine for now, observe for apnea


Monitor for problems related to prematurity


Support parents with information and teaching











CONNER DIAZ 2017 09:52

## 2017-01-01 NOTE — HP
DATE OF ADMISSION: 2017

 

TIME OF BIRTH:  

 

ADMISSION DIAGNOSIS:  A 31-5/7-week  twin A male infant with retained 
lung fluid/mild respiratory distress syndrome, observation for sepsis, 
physiologic jaundice and a risk for apnea of prematurity, hypermagnesemia.

 

I have seen and examined this infant, attended the delivery by  section 
and guided the resuscitation and admission to the NICU.  

 

This infant is the 1315 gram product of a 31-5/7-week twin gestation by dates.  
Mother was admitted on 2017 at 24-6/7 weeks' gestation with short cervix, 
 labor.  Mother received a course of steroids on  and .  She 
has been treated with magnesium sulfate, recently increased because of 
increasing contractions and has dilated to 5 cm.  Because of twin gestation and 
breech presentation, decision was made to deliver by primary  section.  
Rupture of membranes occurred at the time of delivery with clear fluid.

 

PRENATALS:  The mother had prenatal care with Dr. [place].  The mother is 35 years 
old,  2, para 1.  Her prenatals show that she is O positive, serology 
nonreactive, hepatitis surface antigen negative, HIV negative, rubella immune 
and GBS negative.  Pregnancy was complicated according to prenatals for 
gestational diabetes which was diet controlled.  [facility] presented to [facility] on  with  labor, a short cervix as noted 
above.  The mother has 1 other child with no significant medical issues, and 
there is no family history.  Mom denies any drugs, alcohol or smoking.

 

This infant was delivered as a double footling breech, receiving Apgars of 5 at 
one minute and 8 at five minutes.  The infant initially had bulb suction, was 
given cord stripping 6 times, and then cord was clamped and transferred to the 
radiant warmer at approximately 20 to 30 seconds of age.  The infant was again 
stimulated, given suction and did not cry, was therefore given positive 
pressure ventilation for approximately 30 to 60 seconds with resultant good 
respiratory activity, crying, was then suctioned for a large amount of fluid 
subsequently and placed on CPAP of 5 by bubble and was transferred to the NICU 
once stable.  

 

In the NICU, the infant was initially placed in a radiant warmer and remained 
on room air, bubble CPAP of 5.  An initial venous blood gas showed a pH of 7.25
, pCO2 of 56, pO2 of 43 with a base excess of -4.4.  Chest x-ray was performed 
which showed increased interstitial markings and a mild hazy pattern consistent 
with retained lung fluid or mild respiratory distress syndrome.  Cardiothymic 
shadow was normal, as were the osseous structures.  Laboratories were sent, and 
initial Accu-Chek was 55.

 

PHYSICAL EXAMINATION:

GENERAL:  An alert, active infant with gentle tachypnea.

VITAL SIGNS:  The weight is 1315 grams.  The length is 37 cm.  Head 
circumference is 29.5 cm.  Temperature 36.6, pulse 128, respiratory rate 59, 
blood pressure 61/32 with a mean of 42, saturation to 96%.

HEENT:  [place] 1 x 2, soft, slightly overlapping sutures.  Eyes:  PERRL.  
Red reflex bilaterally.  Ears normally placed and configured.  Nose patent 
bilaterally with bubble CPAP cannula in place.  Oropharynx:  No clefts or other 
abnormalities.  He has OG tube in place.

CHEST:  Breath sounds are equal bilaterally through scattered rales in both 
bases.  There are mild substernal low intercostal retractions.  No grunting or 
flaring.  Gentle tachypnea.  Work of breathing appears normal.

HEART:  Regular rhythm.  S1 is normal, S2 normally split.  Precordial activity 
normal.  No murmurs appreciated, and pulses are 1-2/4 bilaterally and equal.

ABDOMEN:  Soft, round, nontender.  Liver at the right costal margin.  No spleen 
is felt.  Both kidneys palpated.  Umbilical cord:  Three vessels.  No masses.  
Bowel sounds are few.

GENITALIA:  Male with minimal rugae and pigmentation.  Testes are in the high 
scrotum.  Anus is patent.

EXTREMITIES:  Twenty digits.  Full range of motion.  No clicks or other 
abnormalities with good perfusion.

CENTRAL NERVOUS SYSTEM:  Tone is appropriate.  Deep tendon reflexes 1/4.  [place] 
is incomplete.  Suck poor, grasp poor.

SKIN:  Pink.  No birthmarks appreciated.

 

PLAN:

1.  Admit to the NICU.

2.  Cardiorespiratory and saturation monitoring.

3.  N.p.o., to start on parenteral nutrition D10 at 70 to 90 mL/kg per day, 
following Accu-Cheks and I and O closely.

4.  Bubble CPAP.

5.  Follow p.r.n. blood gases and continuous saturation monitoring and vital 
sign monitoring.

6.  Start on caffeine with a loading dose today and then q.24 hours 
subsequently.

7.  CBC and blood culture on admission.  Will start on antibiotics, ampicillin 
50 mg/kg q.2 hours, gentamicin 4.5 mg/kg q.36 hours, following gentamicin 
trough and cultures.

8.  Follow bilirubins, do cord blood typing.  Consider phototherapy if 
significantly elevated.

9.  Hearing screen, car seat challenge, developmental evaluation prior to 
discharge.

10.  Consider ROP screening exam at 4 to 6 weeks of life.  

 

Father at bedside has been updated on infant's clinical status and progress.  I 
spoke to him about the risks, benefits and alternatives of umbilical arterial 
line placement, percutaneous arterial line placement and PICC line placement, 
and he has signed the appropriate consent forms.  Also a consent form for 
transfusion and a handout for transfusion was given to the father.

 

 

Dictated By: BEE CASIANO/ARETHA

DD:    2017 22:[id number]

DT:    2017 01:10:16

Conf#: [id number]

DID#:  [id number]

CC: MEESOOK KIM MD;*EndCC*

MTDD

## 2017-01-01 NOTE — RADRPT
PROCEDURE:   Cranial ultrasound.

 

CLINICAL INDICATION:   Prematurity. 

 

TECHNIQUE:   Multiple coronal and sagittal sonographic images of the brain were obtained using the a
nterior fontanelle as an acoustic window. 

 

COMPARISON:   Cranial ultrasound dated 2017

 

FINDINGS:

The lateral ventricles are normal in size and configuration.  No intraparenchymal or intraventricula
r hemorrhage is identified.  There are no abnormal extra-axial fluid collections.  The periventricul
ar white matter demonstrates normal echogenicity.  The sulcal pattern is  grossly unremarkable.

 

IMPRESSION:

Normal for age cranial ultrasound.

 

 

RPTAT: HH

_____________________________________________ 

.Prabha Jj MD, MD           Date    Time 

Electronically viewed and signed by .Prabha Jj MD, MD on 2017 13:54 

 

D:  2017 13:54  T:  2017 13:54

.G/

## 2017-01-01 NOTE — PN
Date/Time of Note


Date/Time of Note


DATE: 17 


TIME: 08:57





Neonatology History


Date/Time


Admit Date/Time


May 25, 2017 at 20:50





Day of Life


Day of Life


2





History of Present Illness


HPI


 31-5/7 week first of twins 1469 g infant of gestational diabetic mother 

was  labor from 24-6/7 week, hypermagnesemia.  Presently 31-6/7 week.  

Baby required positive pressure ventilation and will subsequently placed on 

bubble CPAP transfer to the NICU.  Trial off CPAP on  6 AM.  Started on 

caffeine on admission.


Rupture of membranes at  section, no fever, started on antibiotics 

empirically.


At risk for problems related to prematurity such as apnea hyperbilirubinemia 

feeding intolerance and necrotizing enterocolitis intracranial hemorrhage 

retinopathy of prematurity and long-term neurodevelopmental problems.





Physical Exam


Vital Signs


Vitals





 Vital Signs








  Date Time  Temp Pulse Resp B/P Pulse Ox O2 Delivery O2 Flow Rate FiO2


 


17 07:16  112 53     21


 


17 06:00 98.2 116 46  100   


 


17 05:06  110 45  100   21


 


17 04:00 98.6 152 28  100   


 


17 04:00      Bubble CPAP  21


 


17 03:30  123 62  97   21


 


17 02:00   58 53/33 100   


 


17 01:03  116 74  97   21





NPASS Score-Pain: 0





I&O/Weight


I&O


Daily Weight: 1469 grams, Daily Weight change from yesterday: 0 grams, Percent 

change from birth: 0.000, Weight based intake: 43.0204 mL/kg/day, Weight based 

output: 5.037 mL/kg/hr











I & O   


 


 17





 01:00 09:00 17:00


 


Intake Total 38.24 ml 30 ml 


 


Output Total 4.00 ml 70.00 ml 


 


Balance 34.24 ml -40.00 ml 


 


 Intake Detail   


 


IV Total 36.24 ml 30 ml 


 


Other 2.00 ml  


 


 Output Detail   


 


Urine Total 2.00 ml 70.00 ml 


 


Gastric Drainage Total  0 ml 


 


Blood Draw 2.0 ml  


 


# Bowel Movements 0 0 


 


Daily Weight Change 0 gms  


 


Percent Weight Change from Birth 0.000 %  











Physical Exam


Pink no distress, in incubator, room air, NG tube, peripheral IV in the right 

hand.


Temperature 98.2 heart rate 112 respiration 53 blood pressure 53/33 mean of 39.


[place] sutures normal eyes ears nose throat without abnormality neck no 

mass.


Chest no retractions clear breath sounds heart sounds normal, no murmur.


Abdomen soft and nondistended, cord normal aspect, stump dry without redness, 

no mass organomegaly or hernia.


Genitalia normal  male, testes descended.  Anus open


Spine straight and closed, no pits or dimples.


Extremities normal pulses and perfusion, normal tone, no edema.  Hips normal


CNS fair tone normal activity on stimulation.


Skin no bruises, petechiae,  lesions or birthmarks, no rashes, no jaundice.


Head Circumference:  29.5





Medications





 Current Medications


Ampicillin (Ampicillin Iv Syg (Nicu)) 75 mg Q12 IV*  Last administered on  22:28; Admin Dose 75 MG;  Start 17 at 22:00


Gentamicin Sulfate (Gentamicin Iv Syg (Nicu)) 6.6 mg Q36H IV*  Last 

administered on  00:35; Admin Dose 6.6 MG;  Start 17 at 23:00


Caffeine Citrated 8.8 mg 8.8 mg Q24H IV ;  Start 17 at 22:00


Total Parenteral Nutrition (Tpn ([facility])) 250 ml @ 5 mls/hr Q24H IV  Last 

administered on  23:45; Admin Dose 5 MLS/HR;  Start 17 at 00:00





Laboratory


Results 24 hrs





Laboratory Tests








Test


  17


21:30 17


21:32 17


21:43 17


04:32


 


White Blood Count 9.4     


 


Red Blood Count 4.91     


 


Hemoglobin 17.1     


 


Hematocrit 50.0     


 


Mean Corpuscular Volume 101.8     


 


Mean Corpuscular Hemoglobin 34.8  H   


 


Mean Corpuscular Hemoglobin


Concent 34.2  


  


  


  


 


 


Red Cell Distribution Width 16.1  H   


 


Platelet Count 223     


 


Mean Platelet Volume 9.5     


 


Neutrophils % 22.0  L   


 


Lymphocytes % 67.0  H   


 


Monocytes % 11.0     


 


Nucleated Red Blood Cells % 9.0  H   


 


Neutrophils # 2.1     


 


Lymphocytes # 6.3  H   


 


Monocytes # 1.0  H   


 


Polychromasia 1+     


 


Magnesium Level 5.4  *H   


 


Bedside Glucose  55  L  104  


 


Blood Gas Specimen Source


  


  


  Blood


capillary 


 


 


Arterial Blood Date Drawn


  


  


  2017


9:43:25 PM 


 


 


Arterial Blood Gas Puncture


Site 


  


  Left HEEL  


  


 


 


Rich Test   N/A   


 


Capillary Blood pH   7.248   


 


Capillary Blood PCO2   56.3   


 


Capillary Blood PO2   43.3   


 


Capillary Blood HCO3   24.0  H 


 


Capillary Blood Base Excess   -4.4   


 


Capillary Blood Oxygen


Saturation 


  


  81.6  


  


 


 


Capillary Blood Oxyhemoglobin   79.8   


 


POC Capillary Blood COHB HHb


(Partha) 


  


  1.1  


  


 


 


Capillary Blood Methemoglobin   1.1   


 


Capillary Blood Hemoglobin   17.8   


 


Blood Gas A-a O2 Differential   39.0   


 


Blood Gas Temperature   37.0   


 


Blood Gas Modality   BCPAP   


 


FiO2   21.0   


 


Blood Gas Critical Value Read


Back 


  


  JT BHARDWAJ M.D  


  


 


 


Blood Gas Notified Whom   MM   


 


Blood Gas Notified Time


  


  


  2017


9:51:08 PM 


 














Test


  17


04:40 17


05:00 


  


 


 


Sodium Level 138     


 


Potassium Level 5.3  H   


 


Chloride Level 111  H   


 


Carbon Dioxide Level 23     


 


Anion Gap 9     


 


Blood Urea Nitrogen 8     


 


Creatinine 0.73     


 


Glucose Level 97     


 


Calcium Level 7.7  L   


 


Total Bilirubin 3.7     


 


Blood Gas Specimen Source


  


  Blood


capillary 


  


 


 


Arterial Blood Date Drawn


  


  2017


4:31:09 AM 


  


 


 


Arterial Blood Gas Puncture


Site 


  Right HEEL  


  


  


 


 


Rich Test  N/A    


 


Capillary Blood pH  7.361    


 


Capillary Blood PCO2  46.6    


 


Capillary Blood PO2  41.7    


 


Capillary Blood HCO3  25.8  H  


 


Capillary Blood Base Excess  -0.3    


 


Capillary Blood Oxygen


Saturation 


  85.8  


  


  


 


 


Capillary Blood Oxyhemoglobin  84.0    


 


POC Capillary Blood COHB HHb


(Partha) 


  1.0  


  


  


 


 


Capillary Blood Methemoglobin  1.1    


 


Capillary Blood Hemoglobin  21.4    


 


Blood Gas A-a O2 Differential  52.2    


 


Blood Gas Temperature  37.0    


 


Blood Gas Actual Respiration


Rate 


  54  


  


  


 


 


Blood Gas Modality  BCPAP    


 


FiO2  21.0    


 


Blood Gas Low PEEP Setting  5.0    


 


Blood Gas Notified Whom  C.V.    


 


Blood Gas Notified Time


  


  2017


4:33:52 AM 


  


 











Medical Decision Making


Assessment


Date of life #2.  Postmenstrual age 31-6/7 week.  The birth weight is 1469 g


Medication caffeine citrate, ampicillin, gentamicin.


Laboratory Accu-Chek fair 55 and 104.  Sodium 138 potassium 5.3 chloride 111 

CO2 23 BUN 8 creatinine 0.73 glucose 97 calcium 7.7 bilirubin 3.7.  Magnesium 

on admission 5.4.  Blood type O+ Dena negative.  WBC 9.4 hemoglobin 17 

hematocrit 51 platelets 223 segments 22 bands 0%.


1.  Fluids and nutrition.  Baby is n.p.o., on vanilla TPN per peripheral IV.  

Passed urine, no meconium yet.  Accu-Chek is stable.


2.  Respiratory.  Positive pressure in the delivery room, subsequently placed 

on bubble CPAP with a normal blood gas.  Chest x-ray well expanded with minimal 

granularity and slight faint fluid line.  The baby came off nasal CPAP this 

morning.  Was started on caffeine there have been no apneas.


3.  Metabolic.  Accu-Cheks stable.  Magnesium on admission 5.4.  Calcium is 7.7.


4.  Heme.  Hematocrit 51 platelets 223.


5.  Infection.  Started on antibiotics because of respiratory distress and 

 twins on  labor history.  The CBC is normal suspect, blood 

culture was sent.


6.  GI/bili.  The blood type of the baby is O+ Dena negative.  Bilirubin 3.7.


7.  CNS.  Hypomagnesemia 5.4.  The baby required positive pressure ventilation 

but subsequently has had no apneas.  Neuro exam is normal his affect tone.  

Pain scores are low.


8.  Social.  No contact with parents as yet.





Today's Plan


Plan


Continue on room air, continue caffeine, monitor for apnea.


Start gavage feeding, breast milk or special care 20, monitor tolerance if you 

have prematurity as well as hypomagnesemia.


Continue TPN support, increase total fluid goal to 100 mL/kg.


Continue antibiotics, await 48 hour blood culture.


Monitor for problems related to prematurity


Support parents with information and teaching.











CONNER DIAZ May 26, 2017 09:07

## 2017-01-01 NOTE — PN
[facility]


 


 


 


 


 Progress Note 


 


Patient Name: Jhonathan Loza Unit Number: [id number]


YOB: 2017 Patient Status: Admitted Inpatient


Attending Doctor: Mya Markham MD Account Number: [id number]


 


Edit: PEYTON STEVEN MD on 17 @ 11:05





Infant examined, chart reviewed and case discussed with Herbert and NP as well as 

the bedside team.  This is a 21-day-old, 31.5 week premature infant with low 

birthweight.  Weight today is 1760 g, increase by 20 g.  Intake and output is 

adequate.  Physical examination shows infant in [place]so[place] with essentially 

normal physical examination and concur with a complete physical examination 

documented below.  Infant is on Poly-Vi-Sol and Juan-In-Sol supplementation.  

Infant is on full feedings with fortified breastmilk 24 Alf at 35 mL every 3 

hours and nipples 6 times ranging from 10-35 mL.  Infant continues to require 

go watch feedings due to slow feedings.  Gaining weight.  Rest of the problem 

list as well as care plans reviewed and agree with the complete problem list 

and care plans documented below.  Discussed with the bedside team.


________________________________________________________________________________

_____


  


Date/Time of Note


Date/Time of Note


DATE: 17 


TIME: 09:22





Neonatology History


Date/Time


Admit Date/Time


May 25, 2017 at 20:50





Day of Life


Day of Life


21





History of Present Illness


HPI


 31-5/7 week first of twins 1469 g infant of gestational diabetic mother 

was  labor from 24-6/7 week, hypermagnesemia.  Presently 34-4/7     

week.  Baby required positive pressure ventilation and was subsequently placed 

on bubble CPAP transfer to the NICU.  Taken off CPAP on  AM.  Started on 

caffeine on admission, caffeine dc'd 


Rupture of membranes at  section, no fever, started on antibiotics 

empirically.


Feeding started tolerated with some residuals, on peripheral TPN, dc'd , 

Feeding  24 alf,nippling improving


Hyperbilirubinemia started on phototherapy on -, Head US  normal


At risk for problems related to prematurity such as apnea hyperbilirubinemia 

feeding intolerance and necrotizing enterocolitis intracranial hemorrhage 

retinopathy of prematurity and long-term neurodevelopmental problems.





Physical Exam


Vital Signs


Vitals





 Vital Signs








  Date Time  Temp Pulse Resp B/P Pulse Ox O2 Delivery O2 Flow Rate FiO2


 


17 07:11  156 52  100   21


 


17 05:30 99.0 160 45  100   


 


17 03:04  135 40  98   21


 


17 02:30 98.1 144 49  100   





NPASS Score-Pain: 0





I&O/Weight


I&O


Daily Weight: 1760 grams, Daily Weight change from yesterday: 20.0 grams, 

Percent change from birth: 19.809, Weight based intake: 159.0909 mL/kg/day, 

Weight based output: 0 mL/kg/hr











I & O   


 


 17





 01:00 09:00 17:00


 


Intake Total 105.0 ml 70.0 ml 


 


Balance 105.0 ml 70.0 ml 


 


 Intake Detail   


 


Bottle 33 ml 24 ml 


 


Tube Feeding 72.0 ml 46.0 ml 


 


 Output Detail   


 


# Urine Diapers 3 2 


 


# Bowel Movements 1 1 


 


Daily Weight Change 20.0!^di  


 


Percent Weight Change from Birth 19.809 %  


 


Tube Feeding Gavage Duration 30 minutes 30 minutes 





 30 minutes 20 minutes 





 15 minutes  











Physical Exam


[facility]


HEENT: [place] soft and flat. Eyes clear without drainage. Ears nose and 

throat without abnormality.


Pulmonary: Respirations are comfortable, breath sounds are bilaterally clear 

and equal.


Cardiovascular: Heart rate and rhythm are normal, no murmur is auscultated. 

Perfusion is good with  quick capillary refill.


Abdomen: Soft without distention. No masses palpated.


: Normal male genitalia.


Neuro: Tone and behavior appropriate for gestational age.


Dermatology: Skin clear and free of rashes.


Extremities: Full range of motion, tone and behavior appropriate for 

gestational age.


Head Circumference:  31.0





Medications





 Current Medications


Multivitamins/ Vitamin C (Poly-Vi-Sol (Nicu)) 0.5 ml BID PO  Last administered 

on 6/14/17at 09:18; Admin Dose 0.5 ML;  Start 6/3/17 at 09:45


Ferrous Sulfate (Juan-In-Sol 5 Mg/ 0.33 ml (Nicu)) 2.7 mg Q12 PO ;  Start  at 21:00;  Status UNV





Laboratory


Results 24 hrs





Laboratory Tests








Test


  17


15:32


 


Lab Scanned Report REFERENCE LAB  











Medical Decision Making


Assessment


1.  Fluids and nutrition: Weight today is 1760 g, increased by 20 g from 

yesterday.  Infant is on full feedings with fortified breastmilk 24 Alf at 35 

mL every 3 hours over 30 minutes.  Infant nippled 6 feedings ranging from 10-35 

mL.  Received 2 complete NG feedings and 4 partial NG feedings, completing 49% 

by bottle. and is tolerating well with intermittent residuals ranging from 0.2 

mL to 3 mL.  Total fluid intake 159 mL/kg per day, urine output 8, BM 1.  

There are no clinical signs of gastroesophageal reflux or NEC.  Abdominal 

examination is benign and infant is gaining weight.


2.  Respiratory:  History of retained lung fluid and bubble CPAP, to room air 

on .  Never had apnea, started on caffeine which was stopped on .  No 

apnea.


3.  Metabolic.  Initial magnesium 5.4, and low calcium asymptomatic improved.  

Stable Accu-Cheks.


4.  Heme:  Hematocrit was 38 platelets 510 on 


5.  Infection:  Congenital sepsis ruled out, antibiotics stopped after 2 days.


6.  GI/bili:  History of phototherapy, maximum bilirubin initial 9.4, rebound 

9.8.  Last bilirubin down to 6.4 on 6/3 and jaundice is clinically resolved.  

Blood type O+ Dena negative.


7.  CNS.  Normal tone and activity, stable temperature in incubator, normal 

neuro exam.  Head ultrasound on  normal.


8.  Cardiovascular: Hemodynamically stable


9.  Social.  Parents visiting and aware of the infant's clinical condition as 

well as the treatment plans





Today's Plan


Plan


Frequent monitoring of vital signs as well as pulse ox saturations and maintain 

greater than 90% and maintain neutral thermal environment.


Continue to monitor for desaturations as well as apnea prematurity.


Continue the present feedings and p.o. as tolerated and NG as needed, 

monitoring for gastroesophageal reflux.


Monitor weight gain.


Monitor hematocrit once in 2 weeks during hospitalization and monitor for 

anemia.


Repeat head ultrasound at 36 weeks of gestation to rule out PVL.


ROP examination at 4-6 weeks of age.


Ongoing parental support and teaching.











HERBERT NELSON NP 2017 09:25

## 2017-01-01 NOTE — PN
[facility]


 


 


 


 


 Progress Note 


 


Patient Name: Jhonathan Loza Unit Number: [id number]


YOB: 2017 Patient Status: Admitted Inpatient


Attending Doctor: Bee Bhardwaj MD Account Number: [id number]


 


Edit: BEE BHARDWAJ MD on 17 @ 15:15





I have seen and examined this infant with RAI TAYLOR.  Concur with physical 

examination and assessment. HEENT normal, chest clear good breath sounds, heart 

regular rhythm no murmurs, abdomen soft good bowel sounds no organomegaly, 

genitalia normal, extremities full range of motion good perfusion, CNS tone 

appropriate, skin pink no rashes.  Concur with plan to work on nutritive support

, monitor for respiratory distress or apnea prematurity, follow hematocrit 

weekly, complete discharge training and teaching.


________________________________________________________________________________

_____


  


Date/Time of Note


Date/Time of Note


DATE: 17 


TIME: 09:34





Neonatology History


Date/Time


Admit Date/Time


May 25, 2017 at 20:50





Day of Life


Day of Life


19





History of Present Illness


HPI


 31-5/7 week first of twins 1469 g infant of gestational diabetic mother 

was  labor from 24-6/7 week, hypermagnesemia.  Presently 34-2/7     

week.  Baby required positive pressure ventilation and was subsequently placed 

on bubble CPAP transfer to the NICU.  Taken off CPAP on  AM.  Started on 

caffeine on admission, caffeine dc'd 


Rupture of membranes at  section, no fever, started on antibiotics 

empirically.


Feeding started tolerated with some residuals, on peripheral TPN, dc'd , 

Feeding per gavage 24 alf


Hyperbilirubinemia started on phototherapy on -, Head US  normal


At risk for problems related to prematurity such as apnea hyperbilirubinemia 

feeding intolerance and necrotizing enterocolitis intracranial hemorrhage 

retinopathy of prematurity and long-term neurodevelopmental problems.





Physical Exam


Vital Signs


Vitals





 Vital Signs








  Date Time  Temp Pulse Resp B/P Pulse Ox O2 Delivery O2 Flow Rate FiO2


 


17 08:00 98.6 152 48 64/33 100   


 


17 07:31  152 58  99   21


 


17 05:00 98.8 150 53  100   


 


17 03:08  158 61  99   21


 


17 02:00 98.1 175 35  100   





NPASS Score-Pain: 0





I&O/Weight


I&O


Daily Weight: 1690 grams, Daily Weight change from yesterday: 40.0 grams, 

Percent change from birth: 15.044, Weight based intake: 152.6627 mL/kg/day, 

Weight based output: 0 mL/kg/hr











I & O   


 


 17





 01:00 09:00 17:00


 


Intake Total 64.0 ml 100.0 ml 


 


Output Total  0.5 ml 


 


Balance 64.0 ml 99.5 ml 


 


 Intake Detail   


 


Bottle 11 ml 26 ml 


 


Tube Feeding 53.0 ml 74.0 ml 


 


 Output Detail   


 


Tube Feeding Residual Discard  0 ml 


 


Blood Draw  0.5 ml 


 


# Urine Diapers 2 3 


 


# Bowel Movements 0 1 


 


Daily Weight Change 40.0!^di  


 


Percent Weight Change from Birth 15.044 %  


 


Tube Feeding Gavage Duration 30 minutes 10 minutes 





 30 minutes 30 minutes 





  30 minutes 











Physical Exam


Active and alert in [place]so[place].


HEENT: [place] soft and flat. Eyes clear without drainage. Ears nose and 

throat without abnormality.


Pulmonary: Respirations are comfortable, breath sounds are bilaterally clear 

and equal.


Cardiovascular: Heart rate and rhythm are normal, no murmur is auscultated. 

Perfusion is good with  quick capillary refill.


Abdomen: Soft without distention. No masses palpated.


: Normal male genitalia.


Neuro: Tone and behavior appropriate for gestational age.


Dermatology: Skin clear and free of rashes.


Extremities: Full range of motion, tone and behavior appropriate for 

gestational age.


Head Circumference:  29.5





Medications





 Current Medications


Multivitamins/ Vitamin C (Poly-Vi-Sol (Nicu)) 0.5 ml BID PO  Last administered 

on t 07:56; Admin Dose 0.5 ML;  Start 6/3/17 at 09:45


Ferrous Sulfate (Juan-In-Sol 5 Mg/ 0.33 ml (Nicu)) 1.5 mg Q12 PO  Last 

administered on t 07:56; Admin Dose 1.5 MG;  Start 17 at 21:00





Laboratory


Results 24 hrs





Laboratory Tests








Test


  17


04:40


 


White Blood Count 12.2  


 


Red Blood Count 4.08  


 


Hemoglobin 13.4  #


 


Hematocrit 38.3  #


 


Mean Corpuscular Volume 93.9  L


 


Mean Corpuscular Hemoglobin 32.8  


 


Mean Corpuscular Hemoglobin


Concent 35.0  


 


 


Red Cell Distribution Width 15.1  H


 


Platelet Count 510  #H


 


Mean Platelet Volume 10.7  H











Medical Decision Making


Assessment


1.  Fluids and nutrition: Weight today is 1690 g, increased by 40 g from 

yesterday.  Infant is on full feedings with fortified breastmilk 24 Alf at 32 

mL every 3 hours over 30 minutes.  Infant nippled for feedings ranging from 11-

26 mL.  Received 5 complete NG feedings and 3 partial NG feedings, completing 19

% by bottle. and is tolerating well with intermittent residuals ranging from 

0.2 mL to 3 mL.  Total fluid intake 153 mL/kg per day, urine output 8, BM 0.  

There are no clinical signs of gastroesophageal reflux or NEC.  Abdominal 

examination is benign and infant is gaining weight.


2.  Respiratory:  History of retained lung fluid and bubble CPAP, to room air 

on .  Never had apnea, started on caffeine which was stopped on .  No 

apnea.


3.  Metabolic.  Initial magnesium 5.4, and low calcium asymptomatic improved.  

Stable Accu-Cheks.


4.  Heme:  Hematocrit was 38 platelets 510 on 


5.  Infection:  Congenital sepsis ruled out, antibiotics stopped after 2 days.


6.  GI/bili:  History of phototherapy, maximum bilirubin initial 9.4, rebound 

9.8.  Last bilirubin down to 6.4 on 6/3 and jaundice is clinically resolved.  

Blood type O+ Dena negative.


7.  CNS.  Normal tone and activity, stable temperature in incubator, normal 

neuro exam.  Head ultrasound on  normal.


8.  Cardiovascular: Hemodynamically stable


9.  Social.  Parents visiting and aware of the infant's clinical condition as 

well as the treatment plans





Today's Plan


Plan


Frequent monitoring of vital signs as well as pulse ox saturations and maintain 

greater than 90% and maintain neutral thermal environment.


Continue to monitor for desaturations as well as apnea prematurity.


Continue the present feedings and p.o. as tolerated and NG as needed, 

monitoring for gastroesophageal reflux.


Monitor weight gain.


Monitor hematocrit once in 2 weeks during hospitalization and monitor for 

anemia.


Repeat head ultrasound at 36 weeks of gestation to rule out PVL.


ROP examination at 4-6 weeks of age.


Ongoing parental support and teaching.











HERBERT NELSON NP 2017 09:37

## 2017-01-01 NOTE — PN
Date/Time of Note


Date/Time of Note


DATE: 17 


TIME: 09:26





Neonatology History


Date/Time


Admit Date/Time


May 25, 2017 at 20:50





Day of Life


Day of Life


3





History of Present Illness


HPI


 31-5/7 week first of twins 1469 g infant of gestational diabetic mother 

was  labor from 24-6/7 week, hypermagnesemia.  Presently 32     week.  

Baby required positive pressure ventilation and was subsequently placed on 

bubble CPAP transfer to the NICU.  Takenl off CPAP on  6 AM.  Started on 

caffeine on admission.


Rupture of membranes at  section, no fever, started on antibiotics 

empirically.


Feeding started tolerated with some residuals, on peripheral TPN.


Hyperbilirubinemia started on phototherapy on .


At risk for problems related to prematurity such as apnea hyperbilirubinemia 

feeding intolerance and necrotizing enterocolitis intracranial hemorrhage 

retinopathy of prematurity and long-term neurodevelopmental problems.





Physical Exam


Vital Signs


Vitals





 Vital Signs








  Date Time  Temp Pulse Resp B/P Pulse Ox O2 Delivery O2 Flow Rate FiO2


 


17 08:00 98.6 120 48 50/26 98   


 


17 07:24  129 64  95   21


 


17 06:00 98.2 112 44  99   


 


17 04:00  131 45  100   


 


17 03:11  133 55  100   21


 


17 02:00 98.6 128 49 68/37 100   





NPASS Score-Pain: 2





I&O/Weight


I&O


Daily Weight: 1375 grams, Daily Weight change from yesterday: -94.0 grams, 

Percent change from birth: -6.398, Weight based intake: 105.9115 mL/kg/day, 

Weight based output: 4.316 mL/kg/hr











I & O   


 


 17





 01:00 09:00 17:00


 


Intake Total 57.33 ml 52.89 ml 


 


Output Total 68.20 ml 31.50 ml 


 


Balance -10.87 ml 21.39 ml 


 


 Intake Detail   


 


IV Total 39.84 ml 37.89 ml 


 


Tube Feeding 11.0 ml 14.0 ml 


 


Other 6.49 ml 1.00 ml 


 


 Output Detail   


 


Urine Total 68.00 ml 30.00 ml 


 


Tube Feeding Residual Discard 0 ml 0 ml 


 


Blood Draw 0.2 ml 1.5 ml 


 


# Urine Diapers 1  


 


# Bowel Movements 1  


 


Daily Weight Change -94.0!^di  


 


Percent Weight Change from Birth -6.398 %  


 


Tube Feeding Gavage Duration 15 minutes 30 minutes 





 30 minutes 30 minutes 





 30 minutes 10 minutes 











Physical Exam


Pink no distress in incubator, room air, NG tube, peripheral IV.


Temperature 98.6 heart rate 120 respiration 48 blood pressure 52/26 mean 33.


[place] sutures normal eyes ears nose throat normal


Chest no retractions clear breath sounds heart sounds normal no murmur


Abdomen soft no mass organomegaly or hernia, cord stump dry


Genitalia normal  male testes descended


Extremities normal perfusion and pulses no edema


Skin mild jaundice no other lesions


CNS normal tone and activity.


Head Circumference:  29.5





Medications





 Current Medications


Ampicillin (Ampicillin Iv Syg ([facility])) 75 mg Q12 IV*  Last administered on  08:28; Admin Dose 75 MG;  Start 17 at 22:00


Gentamicin Sulfate (Gentamicin Iv Syg ([facility])) 6.6 mg Q36H IV*  Last 

administered on  00:35; Admin Dose 6.6 MG;  Start 17 at 23:00


Caffeine Citrated 8.8 mg 8.8 mg Q24H IV  Last administered on  22:06; 

Admin Dose 8.8 MG;  Start 17 at 22:00


Fat Emulsion Intravenous 8 ml @  0.33 mls/hr DAILY@16 IV  Last administered on  15:30; Admin Dose 0.33 MLS/HR;  Start 17 at 16:00


Total Parenteral Nutrition (Tpn ([facility])) 250 ml @  4.8 mls/hr Q24H IV  Last 

administered on  15:29; Admin Dose 4.8 MLS/HR;  Start 17 at 14:00





Laboratory


Results 24 hrs





Laboratory Tests








Test


  17


17:10 17


17:17 17


05:10 17


05:11


 


Blood Gas Specimen Source


  Blood


capillary 


  


  


 


 


Arterial Blood Date Drawn


  2017


5:15:54 PM 


  


  


 


 


Arterial Blood Gas Puncture


Site Right HEEL  


  


  


  


 


 


Rich Test N/A     


 


Capillary Blood pH 7.376     


 


Capillary Blood PCO2 38.7     


 


Capillary Blood PO2 42.0     


 


Capillary Blood HCO3 22.2     


 


Capillary Blood Base Excess -2.5     


 


Capillary Blood Oxygen


Saturation 86.2  


  


  


  


 


 


Capillary Blood Oxyhemoglobin 84.1     


 


POC Capillary Blood COHB HHb


(Partha) 1.2  


  


  


  


 


 


Capillary Blood Methemoglobin 1.2     


 


Capillary Blood Hemoglobin 20.3     


 


Blood Gas A-a O2 Differential 61.4     


 


Blood Gas Temperature 37.0     


 


Blood Gas Modality ROOM AIR     


 


FiO2 21.0     


 


Blood Gas Critical Value Read


Back LEON GARDNER RN  


  


  


  


 


 


Blood Gas Notified Whom SS     


 


Blood Gas Notified Time


  2017


5:20:35 PM 


  


  


 


 


Bedside Glucose  79    42  L


 


White Blood Count   12.6  # 


 


Red Blood Count   5.10   


 


Hemoglobin   17.4   


 


Hematocrit   51.4   


 


Mean Corpuscular Volume   100.8   


 


Mean Corpuscular Hemoglobin   34.1  H 


 


Mean Corpuscular Hemoglobin


Concent 


  


  33.9  


  


 


 


Red Cell Distribution Width   16.5  H 


 


Platelet Count   247   


 


Mean Platelet Volume   9.5   


 


Sodium Level   143   


 


Potassium Level   4.8   


 


Chloride Level   108   


 


Carbon Dioxide Level   24   


 


Anion Gap   16  # 


 


Blood Urea Nitrogen   16   


 


Creatinine   0.81   


 


Glucose Level   36  #L 


 


Calcium Level   8.3  L 


 


Total Bilirubin   9.4  # 














Test


  17


06:17 


  


  


 


 


Bedside Glucose 74     











Medical Decision Making


Assessment


Date of life 3.  Postmenstrual rate 32 weeks.  Weight is 1375 down 94 g.


Medication ampicillin gentamicin caffeine.


Laboratory Accu-Chek 42 and 74..  Sodium 143 potassium 4.8 chloride 108 CO2 24 

BUN 16 creatinine 0.81 calcium 8.3 bilirubin 9.4 WBC 12.6 hemoglobin 17 

hematocrit 51 platelets 247 differential is pending.  Blood type is O+ Dena 

negative.





1.  Fluids and nutrition.  The weight is 1375 down 94 g.  Intake 10 5 mL/kg per 

day urine 4.3 mL/kg/h stool 3.  Feeding started and tolerating with slow 

advance at 5 mL every 3 hours by gavage is on TPN D10 with Accu-Chek 42 and 74 

at 100 mL/kg per day


2.  Respiratory.  Transient support with bubble CPAP that was discontinued on .  Baby is on caffeine.  No apnea.


3.  Metabolic.  Initial magnesium 5.4.  Infant of gestational diabetic mom.  

Last Accu-Chek 42 and 74.  Electrolytes acceptable.  Calcium improved.


4.  Heme.  Hematocrit 51, platelets 247


5.  Infection.  Started on ampicillin and gentamicin, differential today is 

pending.  Blood cultures negative to date, still less than 48 hours.


6.  GI/bili.  Bilirubin up to 9.4.  Blood type is O+ Dena negative.


7.  CNS.  Normal tone and activity normal exam, maintaining temperature in 

incubator, no apnea.


8.  Cardiac.  Hemodynamically stable.


9.  Social.  Parents visited and were updated





Today's Plan


Plan


Advance feeding as tolerated, continue TPN support, increase dextrose and amino 

acids, increase total fluids to 130 mL/kg per day


Start phototherapy and monitor bilirubin


Continue antibiotics awaiting at least 48 hour culture, follow CBC results and 

gentamicin trough


Monitor for apnea, continue caffeine at this time.


Monitor for problems related to prematurity


Support parents with information and teaching.











CONNER DIAZ May 27, 2017 09:39

## 2017-01-01 NOTE — RADRPT
PROCEDURE:   XR Chest. 

 

CLINICAL INDICATION:   Respiratory distress.

 

TECHNIQUE:   A single portable AP view of the chest was obtained. 

 

COMPARISON:   No prior exam is available for comparison. 

 

FINDINGS:

 

 The tip of the enteric tube projects over the left upper quadrant.

 

 No focal airspace opacification, pleural effusion or pneumothorax is seen.  The cardiothymic silhou
ette is unremarkable.  The pulmonary vascular markings are within normal limits.  The visualized por
tion of the upper abdomen and osseous structures are unremarkable.

 

IMPRESSION:

1. The lungs are clear.

2. The tip of the enteric tube projects over the left upper quadrant.

 

 

RPTAT: HH

_____________________________________________ 

.Prabha Jj MD, MD           Date    Time 

Electronically viewed and signed by .Prabha Jj MD, MD on 2017 06:30 

 

D:  2017 06:30  T:  2017 06:30

.G/

## 2017-01-01 NOTE — RADRPT
PROCEDURE:   Cranial ultrasound.

 

CLINICAL INDICATION:   Prematurity. 

 

TECHNIQUE:   Multiple coronal and sagittal sonographic images of the brain were obtained using the a
nterior fontanelle as an acoustic window. 

 

COMPARISON:   No prior exam is available for comparison.

 

FINDINGS:

The lateral ventricles are normal in size and configuration.  No intraparenchymal or intraventricula
r hemorrhage is identified.  There are no abnormal extra-axial fluid collections.  The periventricul
ar white matter demonstrates normal echogenicity.  The sulcal pattern is  grossly unremarkable.

 

IMPRESSION:

Normal for age cranial ultrasound.

 

 

RPTAT: HH

_____________________________________________ 

.Prabha Jj MD, MD           Date    Time 

Electronically viewed and signed by .Prabha Jj MD, MD on 2017 07:56 

 

D:  2017 07:56  T:  2017 07:56

.G/

## 2017-01-01 NOTE — PN
Date/Time of Note


Date/Time of Note


DATE: 17 


TIME: 09:50





Neonatology History


Date/Time


Admit Date/Time


May 25, 2017 at 20:50





Day of Life


Day of Life


6





History of Present Illness


HPI


 31-5/7 week first of twins 1469 g infant of gestational diabetic mother 

was  labor from 24-6/7 week, hypermagnesemia.  Presently 32-3/7     

week.  Baby required positive pressure ventilation and was subsequently placed 

on bubble CPAP transfer to the NICU.  Taken off CPAP on  6 AM.  Started on 

caffeine on admission.


Rupture of membranes at  section, no fever, started on antibiotics 

empirically.


Feeding started tolerated with some residuals, on peripheral TPN.


Hyperbilirubinemia started on phototherapy on  .


At risk for problems related to prematurity such as apnea hyperbilirubinemia 

feeding intolerance and necrotizing enterocolitis intracranial hemorrhage 

retinopathy of prematurity and long-term neurodevelopmental problems.





Physical Exam


Vital Signs


Vitals





 Vital Signs








  Date Time  Temp Pulse Resp B/P Pulse Ox O2 Delivery O2 Flow Rate FiO2


 


17 08:00 98.1 154 44 74/35 100   


 


17 07:23  157 64  99   21


 


17 05:00 98.2 144 46  100   


 


17 03:06  172 67  99   21


 


17 02:00 98.6 148 50  100   





NPASS Score-Pain: 0





I&O/Weight


I&O


Daily Weight: 1380 grams, Daily Weight change from yesterday: 70.0 grams, 

Percent change from birth: -6.058, Weight based intake: 153.0612 mL/kg/day, 

Weight based output: 4.197 mL/kg/hr











I & O   


 


 17





 01:00 09:00 17:00


 


Intake Total 66.48 ml 79.48 ml 


 


Output Total 37.00 ml 46.00 ml 


 


Balance 29.48 ml 33.48 ml 


 


 Intake Detail   


 


IV Total 41.48 ml 38.48 ml 


 


Tube Feeding 25.0 ml 41.0 ml 


 


 Output Detail   


 


Urine Total 37.00 ml 46.00 ml 


 


Tube Feeding Residual Discard 0 ml 0 ml 


 


Daily Weight Change 70.0!^di  


 


Percent Weight Change from Birth -6.058 %  


 


Tube Feeding Gavage Duration 30 minutes 30 minutes 





 30 minutes 30 minutes 





  30 minutes 











Physical Exam


Pink no distress in incubator, room air, NG tube, peripheral IV in the left 

foot.  No distress.


Temperature 98.1 heart rate 154 respiration 44 blood pressure 74/35 mean 49.  


[place] sutures normal, EENT normal


Chest clear breath sounds,  heart sounds normal , no murmur


Abdomen soft no mass organomegaly or hernia,  cord stump dry


Extremities normal pulses and perfusion


Skin no lesions or rashes, no jaundice 


CNS normal tone and activity.


Head Circumference:  29.5





Medications





 Current Medications


Fat Emulsion Intravenous (Liposyn Ii 20% (Nicu)) 23 ml @  0.96 mls/hr [id number] 

IV  Last administered on  13:54; Admin Dose 0.96 MLS/HR;  Start  at 16:00


Caffeine Citrated 8.8 mg 8.8 mg Q24H PO  Last administered on  22:51; 

Admin Dose 8.8 MG;  Start 17 at 22:00


Total Parenteral Nutrition (Tpn (Nicu)) 250 ml @  4.6 mls/hr Q24H IV  Last 

administered on  13:53; Admin Dose 4.6 MLS/HR;  Start 17 at 16:00





Laboratory


Results 24 hrs





Laboratory Tests








Test


  17


16:41 17


04:44 17


05:00


 


Bedside Glucose 98   106   


 


Total Bilirubin   6.7  #











Medical Decision Making


Assessment


Day of life 6.  Postmenstrual rate 32-3/7 week.  Weight is 1380 up 70 g.


Medication caffeine citrate 8.8 mg p.o. daily


Laboratory Accu-Chek 106bilirubin 1 6.7





1.  Fluids and nutrition.  The weight is 1380 up 70 g.  Intake 153 mL/kg urine 

4.1 mL/kg but no stool in the last 24 hours.  Tolerating feeding mostly Similac 

special care  20, by gavage,, up to 14 mL every 3 hours, TPN is dextrose 10% 3 

g lipids at 150 mL/kg total fluid goal.


2.  Respiratory.  Initial bubble CPAP for transient support for retained lung 

fluid, was started on caffeine, changed to p.o. on .  No apnea.


3.  Metabolic.  Initial magnesium 5.4.  Accu-Cheks stable between (infant of 

diabetic mom).  Initial slightly low asymptomatic calcium, improved.


4.  Heme.  Hematocrit 51 platelets 247 .


5.  Infection.  Congenital sepsis ruled out, antibiotics stopped after 2 days 

on .


6.  GI/bili.  History of phototherapy maximum bilirubin was 9.4, slight rebound 

bilirubin to 6.7 today.  Blood type O+ Dena negative.


7.  CNS.  Normal tone and activity.  Temperature stable in incubator.


8.  Cardiac.  Hemodynamic stable.


9.  Social.  Parents visiting regularly and updated.





Today's Plan


Plan


Continue advance feeding.  Changed to 24 alf.  Continue TPN support, stop 

Intralipid.


Head ultrasound


Continue caffeine, observe for apnea


Monitor for problems related to prematurity


Support parents with information and teaching.











CONNER DIAZ May 30, 2017 09:57